# Patient Record
Sex: MALE | Race: BLACK OR AFRICAN AMERICAN | NOT HISPANIC OR LATINO | Employment: OTHER | URBAN - METROPOLITAN AREA
[De-identification: names, ages, dates, MRNs, and addresses within clinical notes are randomized per-mention and may not be internally consistent; named-entity substitution may affect disease eponyms.]

---

## 2017-01-27 ENCOUNTER — OFFICE VISIT (OUTPATIENT)
Dept: INTERNAL MEDICINE | Facility: CLINIC | Age: 77
End: 2017-01-27
Payer: MEDICARE

## 2017-01-27 ENCOUNTER — IMMUNIZATION (OUTPATIENT)
Dept: INTERNAL MEDICINE | Facility: CLINIC | Age: 77
End: 2017-01-27
Payer: MEDICARE

## 2017-01-27 VITALS
SYSTOLIC BLOOD PRESSURE: 110 MMHG | HEIGHT: 67 IN | WEIGHT: 162.69 LBS | DIASTOLIC BLOOD PRESSURE: 72 MMHG | HEART RATE: 66 BPM | BODY MASS INDEX: 25.53 KG/M2

## 2017-01-27 DIAGNOSIS — N13.8 BPH (BENIGN PROSTATIC HYPERTROPHY) WITH URINARY OBSTRUCTION: ICD-10-CM

## 2017-01-27 DIAGNOSIS — D64.9 NORMOCYTIC ANEMIA: ICD-10-CM

## 2017-01-27 DIAGNOSIS — Z00.00 ANNUAL PHYSICAL EXAM: ICD-10-CM

## 2017-01-27 DIAGNOSIS — E78.5 HYPERLIPIDEMIA, UNSPECIFIED HYPERLIPIDEMIA TYPE: ICD-10-CM

## 2017-01-27 DIAGNOSIS — E55.9 VITAMIN D INSUFFICIENCY: ICD-10-CM

## 2017-01-27 DIAGNOSIS — Z23 NEED FOR INFLUENZA VACCINATION: ICD-10-CM

## 2017-01-27 DIAGNOSIS — N40.1 BPH (BENIGN PROSTATIC HYPERTROPHY) WITH URINARY OBSTRUCTION: ICD-10-CM

## 2017-01-27 DIAGNOSIS — M25.50 ARTHRALGIA, UNSPECIFIED JOINT: Primary | ICD-10-CM

## 2017-01-27 DIAGNOSIS — Z23 NEED FOR 23-POLYVALENT PNEUMOCOCCAL POLYSACCHARIDE VACCINE: ICD-10-CM

## 2017-01-27 PROCEDURE — 90732 PPSV23 VACC 2 YRS+ SUBQ/IM: CPT | Mod: PBBFAC | Performed by: INTERNAL MEDICINE

## 2017-01-27 PROCEDURE — 99213 OFFICE O/P EST LOW 20 MIN: CPT | Mod: PBBFAC,25 | Performed by: INTERNAL MEDICINE

## 2017-01-27 PROCEDURE — G0008 ADMIN INFLUENZA VIRUS VAC: HCPCS | Mod: PBBFAC | Performed by: INTERNAL MEDICINE

## 2017-01-27 PROCEDURE — 99999 PR PBB SHADOW E&M-EST. PATIENT-LVL III: CPT | Mod: PBBFAC,,, | Performed by: INTERNAL MEDICINE

## 2017-01-27 PROCEDURE — 99215 OFFICE O/P EST HI 40 MIN: CPT | Mod: S$PBB,,, | Performed by: INTERNAL MEDICINE

## 2017-01-27 RX ORDER — ATORVASTATIN CALCIUM 80 MG/1
80 TABLET, FILM COATED ORAL DAILY
Qty: 90 TABLET | Refills: 3 | Status: SHIPPED | OUTPATIENT
Start: 2017-01-27 | End: 2018-07-16 | Stop reason: SDUPTHER

## 2017-01-27 NOTE — PROGRESS NOTES
Verified patient's name, date of birth and allergies. Patient advised to remain in building 15 min after injection.

## 2017-01-27 NOTE — MR AVS SNAPSHOT
[facility]  [address]  [facility] [address]  Phone: [phone]  Fax: [phone]                  Fadi Higgins   2017 2:00 PM   Office Visit    Description:  Male : 1940   Provider:  Ronald Finch MD   Department:  [facility]           Reason for Visit     Annual Exam           Diagnoses this Visit        Comments    Arthralgia, unspecified joint    -  Primary     Hyperlipidemia, unspecified hyperlipidemia type         Normocytic anemia         Vitamin D insufficiency         BPH (benign prostatic hypertrophy) with urinary obstruction         Need for 23-polyvalent pneumococcal polysaccharide vaccine         Need for influenza vaccination         Annual physical exam                To Do List           Goals (5 Years of Data)     None      Follow-Up and Disposition     Return in about 6 months (around 2017).       These Medications        Disp Refills Start End    atorvastatin (LIPITOR) 80 MG tablet 90 tablet 3 2017    Take 1 tablet (80 mg total) by mouth once daily. - Oral    [facility]acy: [facility] [address] [place] [address], [place] - [address] AT [facility]  & [facility] Ph #: [phone]         [facility] On Call     [facility] On Call Nurse Care Line -  Assistance  Registered nurses in the [facility] On Call Center provide clinical advisement, health education, appointment booking, and other advisory services.  Call for this free service at [phone].             Medications           Message regarding Medications     Verify the changes and/or additions to your medication regime listed below are the same as discussed with your clinician today.  If any of these changes or additions are incorrect, please notify your healthcare provider.        STOP taking these medications     simvastatin (ZOCOR) 40 MG tablet            Verify that the below list of medications is an accurate representation of the  "medications you are currently taking.  If none reported, the list may be blank. If incorrect, please contact your healthcare provider. Carry this list with you in case of emergency.           Current Medications     atorvastatin (LIPITOR) 80 MG tablet Take 1 tablet (80 mg total) by mouth once daily.    cholecalciferol, vitamin D3, 2,000 unit Cap Take 1 capsule by mouth once daily.    co-enzyme Q-10 50 mg capsule Take 50 mg by mouth once daily.    fish oil-omega-3 fatty acids 300-1,000 mg capsule Take 2 g by mouth once daily.    saw palmetto 160 MG capsule Take 160 mg by mouth 2 (two) times daily.           Clinical Reference Information           Vital Signs - Last Recorded  Most recent update: 1/27/2017  2:29 PM by Isabelle Goodman MA    BP Pulse Ht Wt BMI    110/72 (BP Location: Right arm, Patient Position: Sitting, BP Method: Manual) 66 5' 7" (1.702 m) 73.8 kg (162 lb 11.2 oz) 25.48 kg/m2      Blood Pressure          Most Recent Value    BP  110/72      Allergies as of 1/27/2017     No Known Allergies      Immunizations Administered on Date of Encounter - 1/27/2017     Name Date Dose VIS Date Route    Pneumococcal Polysaccharide - 23 Valent  Incomplete 0.5 mL 4/24/2015 Intramuscular      Orders Placed During Today's Visit      Normal Orders This Visit    Pneumococcal Polysaccharide Vaccine (23 Valent) (SQ/IM)     Future Labs/Procedures Expected by Expires    STEPHANIE  1/27/2017 4/27/2017    CBC Without Differential  1/27/2017 (Approximate) 3/28/2018    Comprehensive metabolic panel  1/27/2017 1/27/2018    CYCLIC CITRUL PEPTIDE ANTIBODY, IGG  1/27/2017 3/28/2018    Lipid panel  1/27/2017 3/28/2018    Rheumatoid factor  1/27/2017 4/27/2017    Vitamin D  1/27/2017 (Approximate) 3/28/2018      Instructions    Please continue to take atorvastatin 80 mg once daily (or every other day). Please check your home medications to see if you have simvastatin; if so, please discard this medication and continue the atorvastatin " instead.    Please do regular exercise, at least 2 hours (120 minutes) spread out throughout the week.    Please continue to use Naproxen (Aleve) 1-2 times per day as needed.

## 2017-01-27 NOTE — PROGRESS NOTES
Subjective:       Patient ID: Fadi Higgins is a 76 y.o. male.    Chief Complaint: Annual Exam    HPI    Last visit with me 1 yr ago. Since then seen by Orthopedics and Urology.     Reports some problems with left hip. Went to Orthopedics at outside clinic, recommended hip replacement. Reports pain. Given cumin for arthritis by dtr. Pain in PIP of ring fingers bilaterally. Diffuse osteoarthritis pain in wrists and shoulder as well.     Doesn't take cholesterol meds daily, watching what he eats.     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     Urinary symptoms doing well on saw palmetto.    Reviewed PMH, PSH, SH, FH, allergies, and medications.     Review of Systems   All other systems reviewed and are negative.      Objective:      Physical Exam   Constitutional: He is oriented to person, place, and time. No distress.   HENT:   Head: Atraumatic.   Right Ear: Tympanic membrane normal.   Left Ear: Tympanic membrane normal.   Mouth/Throat: Oropharynx is clear and moist. No oropharyngeal exudate.   Eyes: Pupils are equal, round, and reactive to light. Right eye exhibits no discharge. Left eye exhibits no discharge.   Neck: Normal range of motion. No thyromegaly present.   Cardiovascular: Normal rate, regular rhythm and normal heart sounds.    Pulmonary/Chest: Effort normal and breath sounds normal. He has no wheezes. He has no rales.   Abdominal: Soft. He exhibits no distension and no mass. There is no hepatosplenomegaly. There is no tenderness. There is no rigidity, no guarding and negative Bundy's sign.   Musculoskeletal: He exhibits no edema or tenderness.   Some enlargement of MCP joints bilaterally without erythema or effusion or tenderness to palpation.   Lymphadenopathy:     He has no cervical adenopathy.   Neurological: He is alert and oriented to person, place, and time.   Skin: Skin is warm and dry. No rash noted.   Psychiatric: He has a normal mood and affect. His behavior is normal.  "  Nursing note and vitals reviewed.      Vitals:    01/27/17 1427   BP: 110/72   BP Location: Right arm   Patient Position: Sitting   BP Method: Manual   Pulse: 66   Weight: 73.8 kg (162 lb 11.2 oz)   Height: 5' 7" (1.702 m)     Body mass index is 25.48 kg/(m^2).    RESULTS: Reviewed labs from last 18 months    Assessment:       1. Arthralgia, unspecified joint    2. Hyperlipidemia, unspecified hyperlipidemia type    3. Normocytic anemia    4. Vitamin D insufficiency    5. BPH (benign prostatic hypertrophy) with urinary obstruction    6. Need for 23-polyvalent pneumococcal polysaccharide vaccine        Plan:   Fadi was seen today for annual exam.    Diagnoses and all orders for this visit:    Arthralgia, unspecified joint:  Diffuse joint pain, likely osteoarthritis but given distribution will rule out autoimmune disease. Continue over-the-counter cumin, use Aleve 1-2 tabs daily PRN but not too often.  -     STEPHANIE; Future  -     Rheumatoid factor; Future  -     CYCLIC CITRUL PEPTIDE ANTIBODY, IGG; Future    Hyperlipidemia, unspecified hyperlipidemia type:  Recheck levels, continue Lipitor, pt using every other day, if levels okay will continue this regimen.  -     Lipid panel; Future  -     Comprehensive metabolic panel; Future  -     atorvastatin (LIPITOR) 80 MG tablet; Take 1 tablet (80 mg total) by mouth once daily.    Normocytic anemia:  Seen on prior labs, will continue to monitor, no signs of anemia.  -     CBC Without Differential; Future    Vitamin D insufficiency:  Prior diagnosis, well controlled on current management.   -     Vitamin D; Future    BPH (benign prostatic hypertrophy) with urinary obstruction:  Prior diagnosis, well controlled on current management of saw palmetto.    Need for 23-polyvalent pneumococcal polysaccharide vaccine  -     Pneumococcal Polysaccharide Vaccine (23 Valent) (SQ/IM)    Annual exam:  Age-appropriate health screening reviewed. Flu and Pneumovax today, will discuss Zoster " and TDaP next visit.     Return in about 6 months (around 7/27/2017).  Ronald Finch MD  Internal Medicine    Portions of this note were completed using Dragon medical dictation software. Please excuse typographical or syntax errors.

## 2017-01-30 ENCOUNTER — PATIENT MESSAGE (OUTPATIENT)
Dept: INTERNAL MEDICINE | Facility: CLINIC | Age: 77
End: 2017-01-30

## 2017-01-30 ENCOUNTER — LAB VISIT (OUTPATIENT)
Dept: LAB | Facility: HOSPITAL | Age: 77
End: 2017-01-30
Attending: INTERNAL MEDICINE
Payer: MEDICARE

## 2017-01-30 DIAGNOSIS — D64.9 NORMOCYTIC ANEMIA: ICD-10-CM

## 2017-01-30 DIAGNOSIS — M25.50 ARTHRALGIA, UNSPECIFIED JOINT: ICD-10-CM

## 2017-01-30 DIAGNOSIS — E55.9 VITAMIN D INSUFFICIENCY: ICD-10-CM

## 2017-01-30 DIAGNOSIS — E78.5 HYPERLIPIDEMIA, UNSPECIFIED HYPERLIPIDEMIA TYPE: ICD-10-CM

## 2017-01-30 LAB
25(OH)D3+25(OH)D2 SERPL-MCNC: 37 NG/ML
ALBUMIN SERPL BCP-MCNC: 3.5 G/DL
ALP SERPL-CCNC: 89 U/L
ALT SERPL W/O P-5'-P-CCNC: 13 U/L
ANION GAP SERPL CALC-SCNC: 8 MMOL/L
AST SERPL-CCNC: 19 U/L
BILIRUB SERPL-MCNC: 0.6 MG/DL
BUN SERPL-MCNC: 12 MG/DL
CALCIUM SERPL-MCNC: 9 MG/DL
CCP AB SER IA-ACNC: 0.5 U/ML
CHLORIDE SERPL-SCNC: 107 MMOL/L
CHOLEST/HDLC SERPL: 3.6 {RATIO}
CO2 SERPL-SCNC: 26 MMOL/L
CREAT SERPL-MCNC: 0.9 MG/DL
ERYTHROCYTE [DISTWIDTH] IN BLOOD BY AUTOMATED COUNT: 12.9 %
EST. GFR  (AFRICAN AMERICAN): >60 ML/MIN/1.73 M^2
EST. GFR  (NON AFRICAN AMERICAN): >60 ML/MIN/1.73 M^2
GLUCOSE SERPL-MCNC: 85 MG/DL
HCT VFR BLD AUTO: 37.7 %
HDL/CHOLESTEROL RATIO: 28.1 %
HDLC SERPL-MCNC: 171 MG/DL
HDLC SERPL-MCNC: 48 MG/DL
HGB BLD-MCNC: 12.6 G/DL
LDLC SERPL CALC-MCNC: 112.2 MG/DL
MCH RBC QN AUTO: 27.7 PG
MCHC RBC AUTO-ENTMCNC: 33.4 %
MCV RBC AUTO: 83 FL
NONHDLC SERPL-MCNC: 123 MG/DL
PLATELET # BLD AUTO: 261 K/UL
PMV BLD AUTO: 10.2 FL
POTASSIUM SERPL-SCNC: 4.3 MMOL/L
PROT SERPL-MCNC: 7.4 G/DL
RBC # BLD AUTO: 4.55 M/UL
RHEUMATOID FACT SERPL-ACNC: <10 IU/ML
SODIUM SERPL-SCNC: 141 MMOL/L
TRIGL SERPL-MCNC: 54 MG/DL
WBC # BLD AUTO: 5.1 K/UL

## 2017-01-30 PROCEDURE — 80053 COMPREHEN METABOLIC PANEL: CPT

## 2017-01-30 PROCEDURE — 82306 VITAMIN D 25 HYDROXY: CPT

## 2017-01-30 PROCEDURE — 85027 COMPLETE CBC AUTOMATED: CPT

## 2017-01-30 PROCEDURE — 86038 ANTINUCLEAR ANTIBODIES: CPT

## 2017-01-30 PROCEDURE — 86431 RHEUMATOID FACTOR QUANT: CPT

## 2017-01-30 PROCEDURE — 86200 CCP ANTIBODY: CPT

## 2017-01-30 PROCEDURE — 80061 LIPID PANEL: CPT

## 2017-01-30 PROCEDURE — 36415 COLL VENOUS BLD VENIPUNCTURE: CPT | Mod: PO

## 2017-01-31 LAB — ANA SER QL IF: NORMAL

## 2017-05-31 ENCOUNTER — OFFICE VISIT (OUTPATIENT)
Dept: INTERNAL MEDICINE | Facility: CLINIC | Age: 77
End: 2017-05-31
Payer: MEDICARE

## 2017-05-31 VITALS
HEART RATE: 69 BPM | SYSTOLIC BLOOD PRESSURE: 112 MMHG | HEIGHT: 67 IN | DIASTOLIC BLOOD PRESSURE: 66 MMHG | WEIGHT: 153.44 LBS | BODY MASS INDEX: 24.08 KG/M2 | TEMPERATURE: 99 F | OXYGEN SATURATION: 98 %

## 2017-05-31 DIAGNOSIS — M79.89 BILATERAL HAND SWELLING: Primary | ICD-10-CM

## 2017-05-31 PROCEDURE — 99213 OFFICE O/P EST LOW 20 MIN: CPT | Mod: PBBFAC | Performed by: INTERNAL MEDICINE

## 2017-05-31 PROCEDURE — 99213 OFFICE O/P EST LOW 20 MIN: CPT | Mod: S$PBB,,, | Performed by: INTERNAL MEDICINE

## 2017-05-31 PROCEDURE — 99999 PR PBB SHADOW E&M-EST. PATIENT-LVL III: CPT | Mod: PBBFAC,,, | Performed by: INTERNAL MEDICINE

## 2017-05-31 NOTE — PROGRESS NOTES
"Subjective:       Patient ID: Fadi Higgins is a 76 y.o. male.    Chief Complaint: Joint Swelling    HPI     Pt here today for focused urgent care visit for joint swelling.  Says his hands and feet were swollen yesterday.  Feels better today.  Some associated pain and stiffness.  No associated warm or erythema.  Denies hx of prolonged morning stiffness.  Was apparently seen by his PCP, Dr. Finch, for joint issues in the past.  Chart reviewed, RA w/u neg (neg RF, neg anti-CCP) and STEPHANIE neg.    Has also had hip pain, xrays previously c OA and was seen by ortho who rec hip replacement per pt but pt was reluctant because he was afraid he "wouldn't walk again after surgery."    Review of Systems   Constitutional: Negative for activity change and fatigue.   Respiratory: Negative for shortness of breath.    Cardiovascular: Negative for chest pain.   Musculoskeletal: Positive for arthralgias (as per HPI) and joint swelling. Negative for back pain, gait problem and myalgias.         Objective:   /66 (BP Location: Left arm, Patient Position: Sitting)   Pulse 69   Temp 98.6 °F (37 °C)   Ht 5' 7" (1.702 m)   Wt 69.6 kg (153 lb 7 oz)   SpO2 98%   BMI 24.03 kg/m²      Physical Exam   Cardiovascular: Normal rate, regular rhythm and normal heart sounds.    Pulmonary/Chest: Effort normal and breath sounds normal.   Musculoskeletal: He exhibits no edema or tenderness.       Hands and feet c no notable swelling, appears to have some stiffness of L hand c some difficulty clenching L fist tightly but on further exam was normal.      Assessment/Plan     Fadi was seen today for joint swelling.    Diagnoses and all orders for this visit:    Bilateral hand swelling  - Also reported swelling of feet yesterday.  Recent RA w/u neg.  Distribution not c/w gout.  Suspect OA flare (also has OA hip).  Since sx have improved significantly since yesterday c otc nsaid, do not feel additional tx or w/u needed at this time, no swelling " noted on exam today.  Advised to take otc aleve prn, cr wnl.    Can f/u prn, cont routine f/u c Dr. Finch.    Rachel Carson MD  Department of Internal Medicine - [facility]  2:17 PM

## 2017-11-06 ENCOUNTER — TELEPHONE (OUTPATIENT)
Dept: OPHTHALMOLOGY | Facility: CLINIC | Age: 77
End: 2017-11-06

## 2017-11-07 NOTE — PROGRESS NOTES
Assessment /Plan     For exam results, see Encounter Report.    Nevus of left conjunctiva    Status post cataract extraction and insertion of intraocular lens, unspecified laterality      Floaters OD x 3 months  No flashes / Va change    Doing well  Left Nevus of Silvino evaluated by retina service - quiet  Retina 11/26/2013 quiet    PC IOL OU  quiet    Try MRx +250  Bent glasses    Floaters OD  RD precautions:  Discussed symptoms of RD with increased flashes, floaters, decreasing vision.  Patient/Family to call and return immediately to clinic should the symptoms of RD occur. Voiced good understanding with Q & A.        Plan  RTC 1 year IOP & DFE  RTC sooner prn with good understanding

## 2017-11-08 ENCOUNTER — OFFICE VISIT (OUTPATIENT)
Dept: OPHTHALMOLOGY | Facility: CLINIC | Age: 77
End: 2017-11-08
Payer: MEDICARE

## 2017-11-08 DIAGNOSIS — Z96.1 STATUS POST CATARACT EXTRACTION AND INSERTION OF INTRAOCULAR LENS, UNSPECIFIED LATERALITY: ICD-10-CM

## 2017-11-08 DIAGNOSIS — D31.02 NEVUS OF LEFT CONJUNCTIVA: ICD-10-CM

## 2017-11-08 DIAGNOSIS — H43.391 VITREOUS FLOATERS OF RIGHT EYE: Primary | ICD-10-CM

## 2017-11-08 DIAGNOSIS — Z98.49 STATUS POST CATARACT EXTRACTION AND INSERTION OF INTRAOCULAR LENS, UNSPECIFIED LATERALITY: ICD-10-CM

## 2017-11-08 PROCEDURE — 92014 COMPRE OPH EXAM EST PT 1/>: CPT | Mod: S$PBB,,, | Performed by: OPHTHALMOLOGY

## 2017-11-08 PROCEDURE — 99999 PR PBB SHADOW E&M-EST. PATIENT-LVL II: CPT | Mod: PBBFAC,,, | Performed by: OPHTHALMOLOGY

## 2017-11-08 PROCEDURE — 99212 OFFICE O/P EST SF 10 MIN: CPT | Mod: PBBFAC | Performed by: OPHTHALMOLOGY

## 2018-01-08 ENCOUNTER — HOSPITAL ENCOUNTER (OUTPATIENT)
Dept: RADIOLOGY | Facility: HOSPITAL | Age: 78
Discharge: HOME | End: 2018-01-08
Attending: PHYSICIAN ASSISTANT
Payer: MEDICARE

## 2018-01-08 DIAGNOSIS — M25.552 LEFT HIP PAIN: ICD-10-CM

## 2018-01-25 ENCOUNTER — HOSPITAL ENCOUNTER (OUTPATIENT)
Dept: RADIOLOGY | Facility: HOSPITAL | Age: 78
Discharge: HOME | End: 2018-01-25
Attending: ORTHOPAEDIC SURGERY
Payer: MEDICARE

## 2018-01-25 ENCOUNTER — OFFICE VISIT (OUTPATIENT)
Dept: ORTHOPEDICS | Facility: CLINIC | Age: 78
End: 2018-01-25
Payer: MEDICARE

## 2018-01-25 VITALS — WEIGHT: 159.31 LBS | HEIGHT: 67 IN | BODY MASS INDEX: 25 KG/M2

## 2018-01-25 DIAGNOSIS — M25.552 LEFT HIP PAIN: ICD-10-CM

## 2018-01-25 DIAGNOSIS — M16.12 PRIMARY OSTEOARTHRITIS OF LEFT HIP: ICD-10-CM

## 2018-01-25 DIAGNOSIS — M25.552 LEFT HIP PAIN: Primary | ICD-10-CM

## 2018-01-25 PROCEDURE — 73502 X-RAY EXAM HIP UNI 2-3 VIEWS: CPT | Mod: TC,LT

## 2018-01-25 PROCEDURE — 99212 OFFICE O/P EST SF 10 MIN: CPT | Mod: PBBFAC,25 | Performed by: ORTHOPAEDIC SURGERY

## 2018-01-25 PROCEDURE — 73502 X-RAY EXAM HIP UNI 2-3 VIEWS: CPT | Mod: 26,LT,, | Performed by: RADIOLOGY

## 2018-01-25 PROCEDURE — 99999 PR PBB SHADOW E&M-EST. PATIENT-LVL II: CPT | Mod: PBBFAC,,, | Performed by: ORTHOPAEDIC SURGERY

## 2018-01-25 PROCEDURE — 99204 OFFICE O/P NEW MOD 45 MIN: CPT | Mod: S$PBB,,, | Performed by: ORTHOPAEDIC SURGERY

## 2018-01-25 NOTE — PROGRESS NOTES
"HPI:    Patient is here today for a chief complaint of left hip pain.     Duration: 24 months  Intensity: severe  Character of pain: sharp  Location: He reports that the pain is predominately  In the groin.  Patient pain increases with activity.  Pain is worse with weightbearing and the pain interferes with activities of daily living.    He has tried conservative management including NSAIDS and activity modification without relief.    He has discussed options with his family and wishes to schedule LUIS     PMSFSH reviewed per clinic record    Past Medical History:   Diagnosis Date    BPH (benign prostatic hyperplasia)     Chronic constipation     Hyperlipidemia     Osteoarthritis     Vitiligo           Current Outpatient Prescriptions:     atorvastatin (LIPITOR) 80 MG tablet, Take 1 tablet (80 mg total) by mouth once daily., Disp: 90 tablet, Rfl: 3    cholecalciferol, vitamin D3, 2,000 unit Cap, Take 1 capsule by mouth once daily., Disp: , Rfl:     fish oil-omega-3 fatty acids 300-1,000 mg capsule, Take 2 g by mouth once daily., Disp: , Rfl:     saw palmetto 160 MG capsule, Take 160 mg by mouth 2 (two) times daily., Disp: , Rfl:      Review of patient's allergies indicates:  No Known Allergies       ROS  Constitutional: Negative for fever, Negative for weight loss  HENT Negative for congestion  Cardiovascular: Negative chest pain  Respiratory: Negative Shortness of breath  Heme: Negative excessive bleeding  Skin:NegativeItching, Negative breakdown  Musculoskeletal:Negative for back pain, Positive for joint pain, Negative muscle pain, Negative muscle weakness  Neurological: Negative for numbness and paresthesias   Psychiatric/Behavioral: Negative altered mental status, Negative for depression    Physical Exam:   Ht 5' 7" (1.702 m)   Wt 72.3 kg (159 lb 4.5 oz)   BMI 24.95 kg/m²   General appearance: This is a well-developed, Well nourished male No obvious acute distress.  Psychiatric: normal mood and affect; "  pleasant and conversant; judgment and thought content normal  Gait is coordinated. Patient has antalgic gait to the left  Cardiovascular: There are no swelling or varicosities present.   Respiratory: normal respiratory effort   Lymphatic: no adenopathy   Neurologic: alert and oriented to person, place, and time   Deep Tendon Reflexes are normal;  Coordination and Balance: Normal   Musculoskeletal   Neck    ROM shows normal flexion and extension and lateral rotation    Palpation: Non-tender    Stability is normal    Strength is normal    Skin is normal without masses and lesions    Sensation is intact to light touch   Back   There is No back tenderness.    ROM showsabnormal flexion, extension and    rotation    Palpation shows no masses    Stability is normal    Strength to flexion and extension well maintained    Core strength is diminished    Skin shows no rashes or cafe au lait spots;     Sensation is intact to light touch    The Right hip is examined and ROM show flexion 110 , internal rotation 35, external rotation 45. These passive ranges of motion are painful No   Straight leg raising is negative.      The Left hip is examined and ROM show flexion 60 , internal rotation 10, external rotation 20. These passes range is some motion are painful Yes.  Straight leg raising is negative.      Xrays show severe degenerative changes including subchondral sclerosis, periarticular osteophytes and joint space narrowing.     The bone loss is already quite significant and physical therapy  is contraindicated due to potential joint destruction.     Assessment:  Hip arthritis left      He will need to be cleared by Preop center.         He  has a past medical history of BPH (benign prostatic hyperplasia); Chronic constipation; Hyperlipidemia; Osteoarthritis; and Vitiligo. . We will have to take this into account. He  will be followed by the hospitalist service while in the hospital.       We have gone over the hospitalization  and recovery with him as well.  This is typically around 2 weeks on a walker and transition to a cane after that.  He will have home health likely for 3-4 weeks and then transition to outpatient if necessary.  He is agreement with this plan of care and is ready to proceed.  I will see him back for clinical recheck at the 2-week postop jac.  I will see him back for clinical recheck for any other questions or problems as needed and certainly for any other issues in the interim.    We have discussed risks of total hip replacement which include but are not limited to blood clots in the legs that can travel to the lungs (pulmonary embolism). Pulmonary embolism can cause shortness of breath, chest pain, and even shock. Other risks include urinary tract infection, nausea and vomiting (usually related to pain medication), chronic knee pain and stiffness, bleeding into the hip joint, dislocation of the hip, leg length inequality, nerve damage, blood vessel injury, and infection of the hip which can require re-operation. Furthermore, the risks of anesthesia include potential heart, lung, kidney, and liver damage.

## 2018-01-25 NOTE — LETTER
January 25, 2018      Ayla Read PA-C  [address]  [facility] [address]           [facility] - Orthopedics  [address]  [facility] [address]  Phone: [phone]          Patient: Fadi Higgins   MR Number: [id number]   YOB: 1940   Date of Visit: 1/25/2018       Dear Ayla Read:    Thank you for referring Fadi Higgins to me for evaluation. Attached you will find relevant portions of my assessment and plan of care.    If you have questions, please do not hesitate to call me. I look forward to following Fadi Higgins along with you.    Sincerely,    [facility] MD    Enclosure  CC:  No Recipients    If you would like to receive this communication electronically, please contact [email or web address] or [phone] to request more information on [employer] access.    For providers and/or their staff who would like to refer a patient to [facility], please contact us through our one-stop-shop provider referral line, [facility] [place], at [phone].    If you feel you have received this communication in error or would no longer like to receive these types of communications, please e-mail [email or web address]

## 2018-07-16 ENCOUNTER — OFFICE VISIT (OUTPATIENT)
Dept: INTERNAL MEDICINE | Facility: CLINIC | Age: 78
End: 2018-07-16
Payer: MEDICARE

## 2018-07-16 ENCOUNTER — TELEPHONE (OUTPATIENT)
Dept: INTERNAL MEDICINE | Facility: CLINIC | Age: 78
End: 2018-07-16

## 2018-07-16 ENCOUNTER — CLINICAL SUPPORT (OUTPATIENT)
Dept: INTERNAL MEDICINE | Facility: CLINIC | Age: 78
End: 2018-07-16
Payer: MEDICARE

## 2018-07-16 VITALS
WEIGHT: 156.5 LBS | HEART RATE: 60 BPM | BODY MASS INDEX: 24.56 KG/M2 | DIASTOLIC BLOOD PRESSURE: 62 MMHG | SYSTOLIC BLOOD PRESSURE: 106 MMHG | HEIGHT: 67 IN

## 2018-07-16 DIAGNOSIS — N13.8 BPH WITH URINARY OBSTRUCTION: ICD-10-CM

## 2018-07-16 DIAGNOSIS — M16.12 PRIMARY OSTEOARTHRITIS OF LEFT HIP: Primary | ICD-10-CM

## 2018-07-16 DIAGNOSIS — Z23 NEED FOR DIPHTHERIA-TETANUS-PERTUSSIS (TDAP) VACCINE: ICD-10-CM

## 2018-07-16 DIAGNOSIS — E78.5 HYPERLIPIDEMIA, UNSPECIFIED HYPERLIPIDEMIA TYPE: ICD-10-CM

## 2018-07-16 DIAGNOSIS — I70.0 ABDOMINAL AORTIC ATHEROSCLEROSIS: ICD-10-CM

## 2018-07-16 DIAGNOSIS — N40.1 BPH WITH URINARY OBSTRUCTION: ICD-10-CM

## 2018-07-16 PROCEDURE — 99999 PR PBB SHADOW E&M-EST. PATIENT-LVL III: CPT | Mod: PBBFAC,,, | Performed by: INTERNAL MEDICINE

## 2018-07-16 PROCEDURE — 99213 OFFICE O/P EST LOW 20 MIN: CPT | Mod: PBBFAC | Performed by: INTERNAL MEDICINE

## 2018-07-16 PROCEDURE — 99215 OFFICE O/P EST HI 40 MIN: CPT | Mod: S$PBB,,, | Performed by: INTERNAL MEDICINE

## 2018-07-16 RX ORDER — ATORVASTATIN CALCIUM 80 MG/1
80 TABLET, FILM COATED ORAL DAILY
Qty: 90 TABLET | Refills: 3 | Status: SHIPPED | OUTPATIENT
Start: 2018-07-16 | End: 2019-09-05 | Stop reason: SDUPTHER

## 2018-07-16 NOTE — PROGRESS NOTES
Subjective:       Patient ID: Fadi Higgins is a 77 y.o. male.    Chief Complaint: Annual Exam and Hip Pain (lt)    HPI    Last visit with me 01/2017.  Since then seen by Internal Medicine, Ophthalmology, Orthopedics.     Longstanding left hip pain. Hasn't decided yet if he wants to do hip replacement. Very difficult to walk. Joint aspiration hasn't helped.    Some nights get up 3-4x/night to urinate, not every night, not too bothersome.    Reviewed PMH, PSH, SH, FH, allergies, and medications.     Review of Systems   All other systems reviewed and are negative.      Objective:      Physical Exam   Constitutional: He is oriented to person, place, and time. No distress.   -American man whose Body mass index is 24.52 kg/m².    HENT:   Head: Atraumatic.   Right Ear: Tympanic membrane normal. No tenderness.   Left Ear: No tenderness.   Mouth/Throat: Oropharynx is clear and moist. No oropharyngeal exudate.   tympanic membrane obscured by cerumen on left    Eyes: Pupils are equal, round, and reactive to light. Right eye exhibits no discharge. Left eye exhibits no discharge.   Neck: Normal range of motion. No thyromegaly present.   Cardiovascular: Normal rate, regular rhythm and normal heart sounds.    Pulmonary/Chest: Effort normal and breath sounds normal. No stridor. He has no wheezes. He has no rales.   Abdominal: Soft. He exhibits no distension and no mass. There is no tenderness. There is no guarding.   Genitourinary: Rectum normal. Rectal exam shows no external hemorrhoid. Prostate is enlarged (without nodules). Prostate is not tender.   Musculoskeletal: He exhibits no edema or tenderness.   Ambulates with limp due to pain in left hip   Lymphadenopathy:     He has no cervical adenopathy.   Neurological: He is alert and oriented to person, place, and time.   Skin: Skin is warm and dry. No rash noted.   Psychiatric: He has a normal mood and affect. His behavior is normal.   Nursing note and vitals  "reviewed.      Vitals:    07/16/18 0931   BP: 106/62   BP Location: Right arm   Patient Position: Sitting   BP Method: Large (Manual)   Pulse: 60   Weight: 71 kg (156 lb 8.4 oz)   Height: 5' 7" (1.702 m)     Body mass index is 24.52 kg/m².    Assessment:       1. Primary osteoarthritis of left hip    2. Hyperlipidemia, unspecified hyperlipidemia type    3. Abdominal aortic atherosclerosis    4. BPH with urinary obstruction    5. Need for diphtheria-tetanus-pertussis (Tdap) vaccine        Plan:   Fadi was seen today for annual exam and hip pain.    Diagnoses and all orders for this visit:    Primary osteoarthritis of left hip:  Chronic problem, significant, limiting ability to walk long distances. 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 Still not sure about doing surgery, Orthopedics has recommended THR. continue follow up with Orthopedics.    Hyperlipidemia, unspecified hyperlipidemia type:  Prior dx, out of statin, restart Lipitor and check labs in 4 weeks.  -     atorvastatin (LIPITOR) 80 MG tablet; Take 1 tablet (80 mg total) by mouth once daily.  -     Comprehensive metabolic panel; Future  -     Lipid panel; Future    Abdominal aortic atherosclerosis:  Prior dx, asymptomatic. Continue control of blood pressure and cholesterol to limit further atherosclerosis.   -     CBC Without Differential; Future    BPH with urinary obstruction:  Prior dx, today prostate is still enlarged, no nodules. Getting up 3-4x/night some nights. Discussed Flomax, pt defers. Check PSA levels.  -     Prostate Specific Antigen, Diagnostic; Future    Need for diphtheria-tetanus-pertussis (Tdap) vaccine    Follow-up in about 1 year (around 7/16/2019) for EPP annual exam. Fasting labs in 4 weeks.  Ronald Finch MD  Internal Medicine    Portions of this note were completed using medical dictation software. Please excuse typographical or syntax errors that were missed on review.    "

## 2018-08-13 ENCOUNTER — LAB VISIT (OUTPATIENT)
Dept: LAB | Facility: HOSPITAL | Age: 78
End: 2018-08-13
Attending: INTERNAL MEDICINE
Payer: MEDICARE

## 2018-08-13 DIAGNOSIS — I70.0 ABDOMINAL AORTIC ATHEROSCLEROSIS: ICD-10-CM

## 2018-08-13 DIAGNOSIS — N40.1 BPH WITH URINARY OBSTRUCTION: ICD-10-CM

## 2018-08-13 DIAGNOSIS — E78.5 HYPERLIPIDEMIA, UNSPECIFIED HYPERLIPIDEMIA TYPE: ICD-10-CM

## 2018-08-13 DIAGNOSIS — N13.8 BPH WITH URINARY OBSTRUCTION: ICD-10-CM

## 2018-08-13 LAB
ALBUMIN SERPL BCP-MCNC: 3.5 G/DL
ALP SERPL-CCNC: 92 U/L
ALT SERPL W/O P-5'-P-CCNC: 22 U/L
ANION GAP SERPL CALC-SCNC: 6 MMOL/L
AST SERPL-CCNC: 23 U/L
BILIRUB SERPL-MCNC: 0.6 MG/DL
BUN SERPL-MCNC: 13 MG/DL
CALCIUM SERPL-MCNC: 8.7 MG/DL
CHLORIDE SERPL-SCNC: 109 MMOL/L
CHOLEST SERPL-MCNC: 170 MG/DL
CHOLEST/HDLC SERPL: 3.4 {RATIO}
CO2 SERPL-SCNC: 27 MMOL/L
COMPLEXED PSA SERPL-MCNC: 1.3 NG/ML
CREAT SERPL-MCNC: 0.8 MG/DL
ERYTHROCYTE [DISTWIDTH] IN BLOOD BY AUTOMATED COUNT: 13.3 %
EST. GFR  (AFRICAN AMERICAN): >60 ML/MIN/1.73 M^2
EST. GFR  (NON AFRICAN AMERICAN): >60 ML/MIN/1.73 M^2
GLUCOSE SERPL-MCNC: 88 MG/DL
HCT VFR BLD AUTO: 35.9 %
HDLC SERPL-MCNC: 50 MG/DL
HDLC SERPL: 29.4 %
HGB BLD-MCNC: 11.8 G/DL
LDLC SERPL CALC-MCNC: 107.2 MG/DL
MCH RBC QN AUTO: 27.8 PG
MCHC RBC AUTO-ENTMCNC: 32.9 G/DL
MCV RBC AUTO: 85 FL
NONHDLC SERPL-MCNC: 120 MG/DL
PLATELET # BLD AUTO: 248 K/UL
PMV BLD AUTO: 9.4 FL
POTASSIUM SERPL-SCNC: 4.2 MMOL/L
PROT SERPL-MCNC: 6.8 G/DL
RBC # BLD AUTO: 4.24 M/UL
SODIUM SERPL-SCNC: 142 MMOL/L
TRIGL SERPL-MCNC: 64 MG/DL
WBC # BLD AUTO: 3.92 K/UL

## 2018-08-13 PROCEDURE — 84153 ASSAY OF PSA TOTAL: CPT

## 2018-08-13 PROCEDURE — 80061 LIPID PANEL: CPT

## 2018-08-13 PROCEDURE — 80053 COMPREHEN METABOLIC PANEL: CPT

## 2018-08-13 PROCEDURE — 85027 COMPLETE CBC AUTOMATED: CPT

## 2018-08-13 PROCEDURE — 36415 COLL VENOUS BLD VENIPUNCTURE: CPT

## 2018-08-14 ENCOUNTER — PATIENT MESSAGE (OUTPATIENT)
Dept: INTERNAL MEDICINE | Facility: CLINIC | Age: 78
End: 2018-08-14

## 2019-03-06 ENCOUNTER — TELEPHONE (OUTPATIENT)
Dept: INTERNAL MEDICINE | Facility: CLINIC | Age: 79
End: 2019-03-06

## 2019-03-06 NOTE — TELEPHONE ENCOUNTER
----- Message from Rod Bran sent at 3/6/2019  7:45 AM CST -----  Contact: Patient [phone]  The patient is requesting a referral to a non [facility] for his hip in [place], at [facility] physical therapy, fax# [phone] and phone [phone]. The  name is Robbielianne Christian.    He has an appointment this morning and they told him that they have not receive the referral yet.    Please call him and let him know the status.    Thank you

## 2019-09-04 ENCOUNTER — OFFICE VISIT (OUTPATIENT)
Dept: INTERNAL MEDICINE | Facility: CLINIC | Age: 79
End: 2019-09-04
Payer: MEDICARE

## 2019-09-04 VITALS
BODY MASS INDEX: 24.53 KG/M2 | SYSTOLIC BLOOD PRESSURE: 110 MMHG | OXYGEN SATURATION: 96 % | DIASTOLIC BLOOD PRESSURE: 62 MMHG | WEIGHT: 156.31 LBS | HEIGHT: 67 IN | HEART RATE: 62 BPM

## 2019-09-04 DIAGNOSIS — H91.93 DECREASED HEARING, BILATERAL: ICD-10-CM

## 2019-09-04 DIAGNOSIS — N40.1 BPH WITH URINARY OBSTRUCTION: ICD-10-CM

## 2019-09-04 DIAGNOSIS — N13.8 BPH WITH URINARY OBSTRUCTION: ICD-10-CM

## 2019-09-04 DIAGNOSIS — I70.0 ABDOMINAL AORTIC ATHEROSCLEROSIS: ICD-10-CM

## 2019-09-04 DIAGNOSIS — R30.0 BURNING WITH URINATION: ICD-10-CM

## 2019-09-04 DIAGNOSIS — E78.5 HYPERLIPIDEMIA, UNSPECIFIED HYPERLIPIDEMIA TYPE: ICD-10-CM

## 2019-09-04 DIAGNOSIS — E55.9 VITAMIN D INSUFFICIENCY: ICD-10-CM

## 2019-09-04 DIAGNOSIS — Z00.00 ENCOUNTER FOR PREVENTIVE HEALTH EXAMINATION: Primary | ICD-10-CM

## 2019-09-04 PROCEDURE — G0439 PPPS, SUBSEQ VISIT: HCPCS | Mod: S$GLB,,, | Performed by: NURSE PRACTITIONER

## 2019-09-04 PROCEDURE — 99999 PR PBB SHADOW E&M-EST. PATIENT-LVL IV: ICD-10-PCS | Mod: PBBFAC,,, | Performed by: NURSE PRACTITIONER

## 2019-09-04 PROCEDURE — 99999 PR PBB SHADOW E&M-EST. PATIENT-LVL IV: CPT | Mod: PBBFAC,,, | Performed by: NURSE PRACTITIONER

## 2019-09-04 PROCEDURE — 99214 OFFICE O/P EST MOD 30 MIN: CPT | Mod: PBBFAC | Performed by: NURSE PRACTITIONER

## 2019-09-04 PROCEDURE — G0439 PR MEDICARE ANNUAL WELLNESS SUBSEQUENT VISIT: ICD-10-PCS | Mod: S$GLB,,, | Performed by: NURSE PRACTITIONER

## 2019-09-04 NOTE — PATIENT INSTRUCTIONS
Counseling and Referral of Other Preventative  (Italic type indicates deductible and co-insurance are waived)    Patient Name: Fadi Higgins  Today's Date: 9/4/2019    Health Maintenance       Date Due Completion Date    TETANUS VACCINE 08/20/1960 ---    Shingles Vaccine (1 of 2) 08/20/1992 ---    Lipid Panel 08/13/2019 8/13/2018    Influenza Vaccine (1) 09/01/2019 1/27/2017        Orders Placed This Encounter   Procedures    URINALYSIS    Ambulatory Referral to Audiology     The following information is provided to all patients.  This information is to help you find resources for any of the problems found today that may be affecting your health:                Living healthy guide: www.[facility].[place].gov      Understanding Diabetes: www.diabetes.org      Eating healthy: www.cdc.gov/healthyweight      CDC home safety checklist: www.cdc.gov/steadi/patient.html      Agency on Aging: www.goea.[place].[facility]      Alcoholics anonymous (AA): www.aa.org      Physical Activity: www.kendy.nih.gov/db6rdsh      Tobacco use: www.quitwithusla.org

## 2019-09-05 ENCOUNTER — PATIENT MESSAGE (OUTPATIENT)
Dept: INTERNAL MEDICINE | Facility: CLINIC | Age: 79
End: 2019-09-05

## 2019-09-05 DIAGNOSIS — N30.00 ACUTE CYSTITIS WITHOUT HEMATURIA: Primary | ICD-10-CM

## 2019-09-05 RX ORDER — ATORVASTATIN CALCIUM 80 MG/1
80 TABLET, FILM COATED ORAL NIGHTLY
Qty: 90 TABLET | Refills: 3 | Status: SHIPPED | OUTPATIENT
Start: 2019-09-05 | End: 2019-10-16 | Stop reason: SDUPTHER

## 2019-09-05 RX ORDER — CIPROFLOXACIN 500 MG/1
500 TABLET ORAL 2 TIMES DAILY
Qty: 14 TABLET | Refills: 0 | Status: SHIPPED | OUTPATIENT
Start: 2019-09-05 | End: 2019-09-12

## 2019-09-05 NOTE — TELEPHONE ENCOUNTER
Please call the patient to notify that his urine tests suggest a urinary tract infection. I am sending in a prescription for antibiotics to his pharmacy; take for 1 wk, Please notify the office if the symptoms persist or worsen. Keep appointment with me next month for further evaluation.

## 2019-09-05 NOTE — PROGRESS NOTES
"Fadi Higgins presented for a  Medicare AWV and comprehensive Health Risk Assessment today. The following components were reviewed and updated:    · Medical history  · Family History  · Social history  · Allergies and Current Medications  · Health Risk Assessment  · Health Maintenance  · Care Team     ** See Completed Assessments for Annual Wellness Visit within the encounter summary.**       The following assessments were completed:  · Living Situation  · CAGE  · Depression Screening  · Timed Get Up and Go  · Whisper Test  · Cognitive Function Screening  ·   ·   · Nutrition Screening  · ADL Screening  · PAQ Screening    Vitals:    09/04/19 1012   BP: 110/62   Pulse: 62   SpO2: 96%   Weight: 70.9 kg (156 lb 4.9 oz)   Height: 5' 6.5" (1.689 m)     Body mass index is 24.85 kg/m².  Physical Exam   Constitutional: He is oriented to person, place, and time. He appears well-developed and well-nourished.   HENT:   Head: Normocephalic and atraumatic.   Nose: Nose normal.   Eyes: Conjunctivae and EOM are normal.   Cardiovascular: Normal rate, regular rhythm, normal heart sounds and intact distal pulses.   Pulmonary/Chest: Effort normal and breath sounds normal.   Musculoskeletal: Normal range of motion.   Neurological: He is alert and oriented to person, place, and time.   Skin: Skin is warm and dry.   Psychiatric: He has a normal mood and affect. His behavior is normal. Judgment and thought content normal.   Nursing note and vitals reviewed.        Diagnoses and health risks identified today and associated recommendations/orders:    1. Encounter for preventive health examination  Assessment performed. Health maintenance updated. Chart review completed.    2. Abdominal aortic atherosclerosis  Noted on imaging. Chronic. Stable. Followed by PCP.    3. Burning with urination  - URINALYSIS; Future  Reports vague urinary symptoms X1 week. Urine has been discolored and cloudy.  Treatment pending report.    4. Decreased hearing, " bilateral  - Ambulatory Referral to Audiology    5. Hyperlipidemia, unspecified hyperlipidemia type  - atorvastatin (LIPITOR) 80 MG tablet; Take 1 tablet (80 mg total) by mouth every evening.  Dispense: 90 tablet; Refill: 3  Component      Latest Ref Rng & Units 8/13/2018   Cholesterol      120 - 199 mg/dL 170   Triglycerides      30 - 150 mg/dL 64   HDL      40 - 75 mg/dL 50   LDL Cholesterol External      63.0 - 159.0 mg/dL 107.2   Hdl/Cholesterol Ratio      20.0 - 50.0 % 29.4   Total Cholesterol/HDL Ratio      2.0 - 5.0 3.4   Non-HDL Cholesterol      mg/dL 120     6. BPH with urinary obstruction  Chronic. Urinary symptoms present today. Followed by PCP.  Last seen by Urology 2016.  - URINALYSIS; Future    7. Vitamin D insufficiency  Chronic. Stable. Followed by PCP.  Component      Latest Ref Rng & Units 1/30/2017   Vit D, 25-Hydroxy      30 - 96 ng/mL 37       Provided Fadi with a 5-10 year written screening schedule and personal prevention plan. Recommendations were developed using the USPSTF age appropriate recommendations. Education, counseling, and referrals were provided as needed. After Visit Summary printed and given to patient which includes a list of additional screenings\tests needed.    Follow up for Annual Wellness Visit in 1 year, follow up with PCP as instructed, ;sooner if problems.    ZARA Angel

## 2019-09-05 NOTE — TELEPHONE ENCOUNTER
I tried calling pt. Twice. The first call, it rang once than had a busy signal. The second time message stated that the call cannot be completed at this time.

## 2019-10-16 ENCOUNTER — TELEPHONE (OUTPATIENT)
Dept: INTERNAL MEDICINE | Facility: CLINIC | Age: 79
End: 2019-10-16

## 2019-10-16 ENCOUNTER — OFFICE VISIT (OUTPATIENT)
Dept: INTERNAL MEDICINE | Facility: CLINIC | Age: 79
End: 2019-10-16
Payer: MEDICARE

## 2019-10-16 ENCOUNTER — LAB VISIT (OUTPATIENT)
Dept: LAB | Facility: HOSPITAL | Age: 79
End: 2019-10-16
Attending: INTERNAL MEDICINE
Payer: MEDICARE

## 2019-10-16 VITALS
HEART RATE: 57 BPM | SYSTOLIC BLOOD PRESSURE: 112 MMHG | WEIGHT: 157.19 LBS | DIASTOLIC BLOOD PRESSURE: 62 MMHG | HEIGHT: 67 IN | BODY MASS INDEX: 24.67 KG/M2 | OXYGEN SATURATION: 98 %

## 2019-10-16 DIAGNOSIS — D64.9 NORMOCYTIC ANEMIA: ICD-10-CM

## 2019-10-16 DIAGNOSIS — N40.1 BPH WITH URINARY OBSTRUCTION: ICD-10-CM

## 2019-10-16 DIAGNOSIS — E78.5 HYPERLIPIDEMIA, UNSPECIFIED HYPERLIPIDEMIA TYPE: ICD-10-CM

## 2019-10-16 DIAGNOSIS — N40.1 BPH WITH URINARY OBSTRUCTION: Primary | ICD-10-CM

## 2019-10-16 DIAGNOSIS — N13.8 BPH WITH URINARY OBSTRUCTION: ICD-10-CM

## 2019-10-16 DIAGNOSIS — N13.8 BPH WITH URINARY OBSTRUCTION: Primary | ICD-10-CM

## 2019-10-16 LAB
ALBUMIN SERPL BCP-MCNC: 3.9 G/DL (ref 3.5–5.2)
ALP SERPL-CCNC: 106 U/L (ref 55–135)
ALT SERPL W/O P-5'-P-CCNC: 18 U/L (ref 10–44)
ANION GAP SERPL CALC-SCNC: 7 MMOL/L (ref 8–16)
AST SERPL-CCNC: 20 U/L (ref 10–40)
BILIRUB SERPL-MCNC: 0.6 MG/DL (ref 0.1–1)
BUN SERPL-MCNC: 13 MG/DL (ref 8–23)
CALCIUM SERPL-MCNC: 9.2 MG/DL (ref 8.7–10.5)
CHLORIDE SERPL-SCNC: 106 MMOL/L (ref 95–110)
CHOLEST SERPL-MCNC: 218 MG/DL (ref 120–199)
CHOLEST/HDLC SERPL: 4 {RATIO} (ref 2–5)
CO2 SERPL-SCNC: 28 MMOL/L (ref 23–29)
COMPLEXED PSA SERPL-MCNC: 1.4 NG/ML (ref 0–4)
CREAT SERPL-MCNC: 0.9 MG/DL (ref 0.5–1.4)
ERYTHROCYTE [DISTWIDTH] IN BLOOD BY AUTOMATED COUNT: 14.6 % (ref 11.5–14.5)
EST. GFR  (AFRICAN AMERICAN): >60 ML/MIN/1.73 M^2
EST. GFR  (NON AFRICAN AMERICAN): >60 ML/MIN/1.73 M^2
FERRITIN SERPL-MCNC: 73 NG/ML (ref 20–300)
GLUCOSE SERPL-MCNC: 80 MG/DL (ref 70–110)
HCT VFR BLD AUTO: 37.9 % (ref 40–54)
HDLC SERPL-MCNC: 55 MG/DL (ref 40–75)
HDLC SERPL: 25.2 % (ref 20–50)
HGB BLD-MCNC: 12.4 G/DL (ref 14–18)
IRON SERPL-MCNC: 115 UG/DL (ref 45–160)
LDLC SERPL CALC-MCNC: 149 MG/DL (ref 63–159)
MCH RBC QN AUTO: 26.3 PG (ref 27–31)
MCHC RBC AUTO-ENTMCNC: 32.7 G/DL (ref 32–36)
MCV RBC AUTO: 81 FL (ref 82–98)
NONHDLC SERPL-MCNC: 163 MG/DL
PLATELET # BLD AUTO: 246 K/UL (ref 150–350)
PMV BLD AUTO: 10.3 FL (ref 9.2–12.9)
POTASSIUM SERPL-SCNC: 4.4 MMOL/L (ref 3.5–5.1)
PROT SERPL-MCNC: 7.7 G/DL (ref 6–8.4)
RBC # BLD AUTO: 4.71 M/UL (ref 4.6–6.2)
SATURATED IRON: 35 % (ref 20–50)
SODIUM SERPL-SCNC: 141 MMOL/L (ref 136–145)
TOTAL IRON BINDING CAPACITY: 324 UG/DL (ref 250–450)
TRANSFERRIN SERPL-MCNC: 219 MG/DL (ref 200–375)
TRIGL SERPL-MCNC: 70 MG/DL (ref 30–150)
WBC # BLD AUTO: 4.32 K/UL (ref 3.9–12.7)

## 2019-10-16 PROCEDURE — 82728 ASSAY OF FERRITIN: CPT

## 2019-10-16 PROCEDURE — 99214 OFFICE O/P EST MOD 30 MIN: CPT | Mod: S$PBB,,, | Performed by: INTERNAL MEDICINE

## 2019-10-16 PROCEDURE — 84153 ASSAY OF PSA TOTAL: CPT

## 2019-10-16 PROCEDURE — 99213 OFFICE O/P EST LOW 20 MIN: CPT | Mod: PBBFAC | Performed by: INTERNAL MEDICINE

## 2019-10-16 PROCEDURE — 99999 PR PBB SHADOW E&M-EST. PATIENT-LVL III: CPT | Mod: PBBFAC,,, | Performed by: INTERNAL MEDICINE

## 2019-10-16 PROCEDURE — 80061 LIPID PANEL: CPT

## 2019-10-16 PROCEDURE — 85027 COMPLETE CBC AUTOMATED: CPT

## 2019-10-16 PROCEDURE — 83540 ASSAY OF IRON: CPT

## 2019-10-16 PROCEDURE — 36415 COLL VENOUS BLD VENIPUNCTURE: CPT

## 2019-10-16 PROCEDURE — 80053 COMPREHEN METABOLIC PANEL: CPT

## 2019-10-16 PROCEDURE — 99999 PR PBB SHADOW E&M-EST. PATIENT-LVL III: ICD-10-PCS | Mod: PBBFAC,,, | Performed by: INTERNAL MEDICINE

## 2019-10-16 PROCEDURE — 99214 PR OFFICE/OUTPT VISIT, EST, LEVL IV, 30-39 MIN: ICD-10-PCS | Mod: S$PBB,,, | Performed by: INTERNAL MEDICINE

## 2019-10-16 RX ORDER — ATORVASTATIN CALCIUM 80 MG/1
80 TABLET, FILM COATED ORAL NIGHTLY
Qty: 90 TABLET | Refills: 3 | Status: SHIPPED | OUTPATIENT
Start: 2019-10-16 | End: 2021-02-05

## 2019-10-16 NOTE — PATIENT INSTRUCTIONS
For earwax, avoid Qtips or cotton swabs so that wax doesn't get pushed further in the ear. Instead use a few drops of mineral oil or olive oil to the ears; you can also purchase over-the-counter Debrox oil. After about 10 minutes, the wax should be loose and can be rinsed out with water from the shower or an over-the-counter syringe with warm water. If this doesn't work, please notify the office.     If you find that even after cleaning out the earwax there are problems with hearing, please notify the office so we can set you up with Audiology for a hearing test.

## 2019-10-16 NOTE — TELEPHONE ENCOUNTER
Please notify the patient that his cholesterol is high.  Today I sent in a new prescription for atorvastatin, he should restart taking this once daily to reduce his cholesterol levels.  All of his other lab work looks good.  I released the results to the patient portal, but it does not look like he has gotten on to the portal recently.  Please ask if he would like to be taken off of the patient portal and use phone calls and letters for results instead.    We will recheck the labs about a week before his visit next year.  No other follow-up is needed at this time.  Please sign and close this encounter once complete.

## 2019-10-16 NOTE — PROGRESS NOTES
"Subjective:       Patient ID: Fadi Higgins is a 79 y.o. male.    Chief Complaint: Annual Exam    HPI    Last visit with me July 2018.  Since then has been seen by Orthopedics for total hip replacement. Was having dysuria but resolved after antibiotics. No new problems or issues.    Reviewed PMH, PSH, SH, FH, allergies, and medications.     Review of Systems   All other systems reviewed and are negative.      Objective:      Physical Exam   Constitutional: He is oriented to person, place, and time. No distress.   HENT:   Head: Atraumatic.   Right Ear: Tympanic membrane normal. No tenderness.   Left Ear: Tympanic membrane normal. No tenderness.   Mouth/Throat: Oropharynx is clear and moist. No oropharyngeal exudate.   tympanic membrane obscured by cerumen bilaterally    Eyes: Pupils are equal, round, and reactive to light. Right eye exhibits no discharge. Left eye exhibits no discharge.   Neck: Normal range of motion. No thyromegaly present.   Cardiovascular: Normal rate, regular rhythm and normal heart sounds.   Pulmonary/Chest: Effort normal and breath sounds normal. No stridor. He has no wheezes. He has no rales.   Abdominal: Soft. There is no tenderness. There is no guarding.   Musculoskeletal: He exhibits no edema or tenderness.   Lymphadenopathy:     He has no cervical adenopathy.   Neurological: He is alert and oriented to person, place, and time.   Skin: Skin is warm and dry. No rash noted.   Psychiatric: He has a normal mood and affect. His behavior is normal.   Nursing note and vitals reviewed.      Vitals:    10/16/19 1015   BP: 112/62   BP Location: Right arm   Patient Position: Sitting   BP Method: Large (Manual)   Pulse: (!) 57   SpO2: 98%   Weight: 71.3 kg (157 lb 3 oz)   Height: 5' 7" (1.702 m)     Body mass index is 24.62 kg/m².    RESULTS: Reviewed labs from last 12 months    Assessment:       1. BPH with urinary obstruction    2. Hyperlipidemia, unspecified hyperlipidemia type    3. Normocytic " anemia        Plan:   Fadi was seen today for annual exam.    Diagnoses and all orders for this visit:    BPH with urinary obstruction:  Prior diagnosis, stable, well controlled on current management. No changes at this time, will continue to monitor.   -     CBC Without Differential; Future  -     Prostate Specific Antigen, Diagnostic; Future    Hyperlipidemia, unspecified hyperlipidemia type:  Prior diagnosis, stable, well controlled on current management. No changes at this time, will continue to monitor.   -     atorvastatin (LIPITOR) 80 MG tablet; Take 1 tablet (80 mg total) by mouth every evening.  -     Comprehensive metabolic panel; Future  -     Lipid panel; Future    Normocytic anemia:  Prior diagnosis, stable, well controlled on current management. No changes at this time, will continue to monitor.   -     Ferritin; Future  -     Iron and TIBC; Future    Follow up in about 1 year (around 10/16/2020) for follow up visit, fasting labs 1 week prior.  Ronald Finch MD  Internal Medicine    Portions of this note were completed using medical dictation software. Please excuse typographical or syntax errors that were missed on review.

## 2020-06-23 ENCOUNTER — PES CALL (OUTPATIENT)
Dept: ADMINISTRATIVE | Facility: CLINIC | Age: 80
End: 2020-06-23

## 2020-10-21 ENCOUNTER — LAB VISIT (OUTPATIENT)
Dept: LAB | Facility: HOSPITAL | Age: 80
End: 2020-10-21
Attending: INTERNAL MEDICINE
Payer: MEDICARE

## 2020-10-21 ENCOUNTER — OFFICE VISIT (OUTPATIENT)
Dept: INTERNAL MEDICINE | Facility: CLINIC | Age: 80
End: 2020-10-21
Payer: MEDICARE

## 2020-10-21 VITALS
HEIGHT: 67 IN | BODY MASS INDEX: 25.9 KG/M2 | DIASTOLIC BLOOD PRESSURE: 70 MMHG | HEART RATE: 55 BPM | WEIGHT: 165 LBS | SYSTOLIC BLOOD PRESSURE: 116 MMHG | OXYGEN SATURATION: 97 %

## 2020-10-21 DIAGNOSIS — E78.5 HYPERLIPIDEMIA, UNSPECIFIED HYPERLIPIDEMIA TYPE: ICD-10-CM

## 2020-10-21 DIAGNOSIS — E78.5 HYPERLIPIDEMIA, UNSPECIFIED HYPERLIPIDEMIA TYPE: Primary | ICD-10-CM

## 2020-10-21 DIAGNOSIS — Z12.9 CANCER SCREENING: ICD-10-CM

## 2020-10-21 DIAGNOSIS — M25.449 SWELLING OF JOINT OF HAND, UNSPECIFIED LATERALITY: ICD-10-CM

## 2020-10-21 DIAGNOSIS — H61.22 EXCESSIVE CERUMEN IN EAR CANAL, LEFT: ICD-10-CM

## 2020-10-21 DIAGNOSIS — I70.0 ABDOMINAL AORTIC ATHEROSCLEROSIS: ICD-10-CM

## 2020-10-21 LAB
ALBUMIN SERPL BCP-MCNC: 3.7 G/DL (ref 3.5–5.2)
ALP SERPL-CCNC: 100 U/L (ref 55–135)
ALT SERPL W/O P-5'-P-CCNC: 13 U/L (ref 10–44)
ANION GAP SERPL CALC-SCNC: 7 MMOL/L (ref 8–16)
AST SERPL-CCNC: 18 U/L (ref 10–40)
BILIRUB SERPL-MCNC: 0.5 MG/DL (ref 0.1–1)
BUN SERPL-MCNC: 13 MG/DL (ref 8–23)
CALCIUM SERPL-MCNC: 9 MG/DL (ref 8.7–10.5)
CCP AB SER IA-ACNC: <0.5 U/ML
CHLORIDE SERPL-SCNC: 107 MMOL/L (ref 95–110)
CHOLEST SERPL-MCNC: 228 MG/DL (ref 120–199)
CHOLEST/HDLC SERPL: 4.1 {RATIO} (ref 2–5)
CO2 SERPL-SCNC: 28 MMOL/L (ref 23–29)
CREAT SERPL-MCNC: 0.9 MG/DL (ref 0.5–1.4)
EST. GFR  (AFRICAN AMERICAN): >60 ML/MIN/1.73 M^2
EST. GFR  (NON AFRICAN AMERICAN): >60 ML/MIN/1.73 M^2
GLUCOSE SERPL-MCNC: 89 MG/DL (ref 70–110)
HDLC SERPL-MCNC: 55 MG/DL (ref 40–75)
HDLC SERPL: 24.1 % (ref 20–50)
LDLC SERPL CALC-MCNC: 149.4 MG/DL (ref 63–159)
NONHDLC SERPL-MCNC: 173 MG/DL
POTASSIUM SERPL-SCNC: 4.7 MMOL/L (ref 3.5–5.1)
PROT SERPL-MCNC: 7.5 G/DL (ref 6–8.4)
RHEUMATOID FACT SERPL-ACNC: <10 IU/ML (ref 0–15)
SODIUM SERPL-SCNC: 142 MMOL/L (ref 136–145)
TRIGL SERPL-MCNC: 118 MG/DL (ref 30–150)

## 2020-10-21 PROCEDURE — 99215 OFFICE O/P EST HI 40 MIN: CPT | Mod: S$PBB,,, | Performed by: INTERNAL MEDICINE

## 2020-10-21 PROCEDURE — 80061 LIPID PANEL: CPT

## 2020-10-21 PROCEDURE — 99214 OFFICE O/P EST MOD 30 MIN: CPT | Mod: PBBFAC | Performed by: INTERNAL MEDICINE

## 2020-10-21 PROCEDURE — 80053 COMPREHEN METABOLIC PANEL: CPT

## 2020-10-21 PROCEDURE — 86431 RHEUMATOID FACTOR QUANT: CPT

## 2020-10-21 PROCEDURE — 99999 PR PBB SHADOW E&M-EST. PATIENT-LVL IV: CPT | Mod: PBBFAC,,, | Performed by: INTERNAL MEDICINE

## 2020-10-21 PROCEDURE — 99999 PR PBB SHADOW E&M-EST. PATIENT-LVL IV: ICD-10-PCS | Mod: PBBFAC,,, | Performed by: INTERNAL MEDICINE

## 2020-10-21 PROCEDURE — 36415 COLL VENOUS BLD VENIPUNCTURE: CPT

## 2020-10-21 PROCEDURE — 99215 PR OFFICE/OUTPT VISIT, EST, LEVL V, 40-54 MIN: ICD-10-PCS | Mod: S$PBB,,, | Performed by: INTERNAL MEDICINE

## 2020-10-21 PROCEDURE — 86200 CCP ANTIBODY: CPT

## 2020-10-21 NOTE — PATIENT INSTRUCTIONS
If you start to have worsening or more bothersome urinary symptoms, please notify the office. We can try a prescription for tamsulosin (Flomax).    Please call [facility] P[facility]ing [facility] at [phone] or [phone] for more information about billing and financial questions. They should be able to answer questions about insurances accepted at [facility].    For earwax, avoid Qtips or cotton swabs so that wax doesn't get pushed further in the ear. Instead use a few drops of mineral oil or olive oil to the ear canal; you can also purchase over-the-counter Debrox oil. After about 10 minutes, the wax should be loose and can be rinsed out with water from the shower, or squirt water into the ear canal over the sink with an over-the-counter syringe with warm water. If this doesn't work, please notify the office.

## 2020-10-21 NOTE — PROGRESS NOTES
Chief Complaint  Chief Complaint   Patient presents with    Annual Exam       HPI  Fadi Higgins is a 80 y.o. male with multiple medical diagnoses as listed in the medical history and problem list that presents for his annual health maintenance exam.  Their last appointment with primary care was 10/16/2020.      Mr. Higgins reports that he has been staying active and well since his last visit. He states that he had intermittent burning pain in his stomach for the last couple of weeks and attributes this to taking a new formulation of sal palmetto that uses for mild symptoms of BPH. Patient says that his abdominal pain ceased with discontinuation of the sal palmetto. Pt declined further medical treatment for his BPH at this time.    Patient states that he has had arthritis in his hands for many years. States that he still has good functional mobility. He is constantly working with his hands doing jobs around the house. He endorses mild stiffness of the PIP and MCP joints that are relieved by keeping his hands active. He noted that his father had a history of rheumatoid arthritis.    PAST MEDICAL HISTORY:  Past Medical History:   Diagnosis Date    BPH (benign prostatic hyperplasia)     Chronic constipation     Hyperlipidemia     Osteoarthritis     Primary osteoarthritis of left hip 7/16/2018    Vitiligo        PAST SURGICAL HISTORY:  Past Surgical History:   Procedure Laterality Date    CATARACT EXTRACTION W/  INTRAOCULAR LENS IMPLANT  2011    [facility] w/ dr. bryant    EYE SURGERY      HIP ARTHROPLASTY Left 5/14/2019    Procedure: ARTHROPLASTY, HIP;  Surgeon: Miki Navarro MD;  [facility];  Service: Orthopedics;  Laterality: Left;    JOINT REPLACEMENT Left 05/14/2019    LUIS       SOCIAL HISTORY:  Social History     Socioeconomic History    Marital status:      Spouse name: Not on file    Number of children: Not on file    Years of education: Not on file    Highest education level: Not  on file   Occupational History    Occupation: retired [employer]    Social Needs    Financial resource strain: Not on file    Food insecurity     Worry: Not on file     Inability: Not on file    Transportation needs     Medical: Not on file     Non-medical: Not on file   Tobacco Use    Smoking status: Former Smoker     Packs/day: 1.00     Years: 5.00     Pack years: 5.00     Types: Cigarettes     Quit date: 1967     Years since quittin.1    Smokeless tobacco: Never Used   Substance and Sexual Activity    Alcohol use: No    Drug use: No    Sexual activity: Yes   Lifestyle    Physical activity     Days per week: Not on file     Minutes per session: Not on file    Stress: Not on file   Relationships    Social connections     Talks on phone: Not on file     Gets together: Not on file     Attends Mu-ism service: Not on file     Active member of club or organization: Not on file     Attends meetings of clubs or organizations: Not on file     Relationship status: Not on file   Other Topics Concern    Not on file   Social History Narrative    Not on file       FAMILY HISTORY:  Family History   Problem Relation Age of Onset    Kidney disease Mother         diabetic    Diabetes Mother     Glaucoma Mother     Blindness Mother     No Known Problems Father     Macular degeneration Neg Hx     Retinal detachment Neg Hx        ALLERGIES AND MEDICATIONS: updated and reviewed.  Review of patient's allergies indicates:  No Known Allergies  Current Outpatient Medications   Medication Sig Dispense Refill    atorvastatin (LIPITOR) 80 MG tablet Take 1 tablet (80 mg total) by mouth every evening. 90 tablet 3    cholecalciferol, vitamin D3, 2,000 unit Cap Take 1 capsule by mouth every evening.       saw palmetto 160 MG capsule Take 160 mg by mouth every evening.        No current facility-administered medications for this visit.          ROS  Review of Systems   Constitutional: Positive  "for unexpected weight change (mild weight gain since COVID).   HENT: Negative.    Eyes: Negative.    Respiratory: Negative.    Cardiovascular: Negative.    Gastrointestinal: Positive for abdominal pain. Negative for abdominal distention, anal bleeding, blood in stool, constipation, diarrhea, nausea, rectal pain and vomiting.   Endocrine: Negative.    Genitourinary: Negative for difficulty urinating, dysuria and enuresis.        Wakes up about 4x/night to urinate   Musculoskeletal: Positive for back pain and joint swelling (fingers).   Skin: Negative.    Allergic/Immunologic: Negative.    Neurological: Negative.    Hematological: Does not bruise/bleed easily.   Psychiatric/Behavioral: Negative.            Physical Exam  Vitals:    10/21/20 0925   BP: 116/70   BP Location: Right arm   Patient Position: Sitting   BP Method: Large (Manual)   Pulse: (!) 55   SpO2: 97%   Weight: 74.8 kg (165 lb)   Height: 5' 7" (1.702 m)    Body mass index is 25.84 kg/m².  Weight: 74.8 kg (165 lb)   Height: 5' 7" (170.2 cm)   Physical Exam  Constitutional:       Appearance: Normal appearance. He is normal weight.   HENT:      Head: Normocephalic and atraumatic.   Eyes:      Extraocular Movements: Extraocular movements intact.   Neck:      Musculoskeletal: Normal range of motion.   Cardiovascular:      Rate and Rhythm: Normal rate and regular rhythm.   Pulmonary:      Effort: Pulmonary effort is normal.      Breath sounds: Normal breath sounds.   Abdominal:      General: Bowel sounds are normal.      Palpations: Abdomen is soft.   Musculoskeletal:         General: Deformity present.        Arms:    Skin:     General: Skin is warm and dry.   Neurological:      Mental Status: He is alert and oriented to person, place, and time.   Psychiatric:         Mood and Affect: Mood normal.           Health Maintenance       Date Due Completion Date    Hepatitis C Screening 1940 ---    TETANUS VACCINE 08/20/1960 ---    Shingles Vaccine (1 of 2) " 08/20/1992 ---    Influenza Vaccine (1) 08/01/2020 1/27/2017    Lipid Panel 10/16/2020 10/16/2019            Assessment and Plan:  Hyperlipidemia, unspecified hyperlipidemia type  -     Lipid Panel; Future; Expected date: 10/21/2020  -     Comprehensive Metabolic Panel; Future; Expected date: 10/21/2020    Abdominal aortic atherosclerosis    Swelling of joint of hand, unspecified laterality  -     Cyclic Citrullinated Peptide Antibody, IgG; Future; Expected date: 10/21/2020  -     Rheumatoid Factor; Future; Expected date: 10/21/2020    Excessive cerumen in ear canal, left    Cancer screening        Addendum 10/21/2020 1:45 PM    I attest that I have reviewed the student note and that the components of the history of the present illness, the physical exam, and the assessment and plan documented were performed by me or were performed in my presence by the student where I verified the documentation and performed (or re-performed) the exam and medical decision making.    Continue currently regimen for treatment of atherosclerosis and hyperlipidemia. Check labs to rule out signs of Rheumatoid Arthritis.    Ronald Finch MD, FACP  [facility]

## 2021-01-07 ENCOUNTER — IMMUNIZATION (OUTPATIENT)
Dept: PRIMARY CARE CLINIC | Facility: CLINIC | Age: 81
End: 2021-01-07
Payer: MEDICARE

## 2021-01-07 DIAGNOSIS — Z23 NEED FOR VACCINATION: ICD-10-CM

## 2021-01-07 PROCEDURE — 91300 COVID-19, MRNA, LNP-S, PF, 30 MCG/0.3 ML DOSE VACCINE: CPT | Mod: PBBFAC | Performed by: FAMILY MEDICINE

## 2021-01-28 ENCOUNTER — IMMUNIZATION (OUTPATIENT)
Dept: PRIMARY CARE CLINIC | Facility: CLINIC | Age: 81
End: 2021-01-28
Payer: MEDICARE

## 2021-01-28 DIAGNOSIS — Z23 NEED FOR VACCINATION: Primary | ICD-10-CM

## 2021-01-28 PROCEDURE — 91300 COVID-19, MRNA, LNP-S, PF, 30 MCG/0.3 ML DOSE VACCINE: CPT | Mod: PBBFAC | Performed by: EMERGENCY MEDICINE

## 2021-01-28 PROCEDURE — 0002A COVID-19, MRNA, LNP-S, PF, 30 MCG/0.3 ML DOSE VACCINE: CPT | Mod: PBBFAC | Performed by: EMERGENCY MEDICINE

## 2021-02-05 DIAGNOSIS — E78.5 HYPERLIPIDEMIA, UNSPECIFIED HYPERLIPIDEMIA TYPE: ICD-10-CM

## 2021-02-05 RX ORDER — ATORVASTATIN CALCIUM 80 MG/1
TABLET, FILM COATED ORAL
Qty: 90 TABLET | Refills: 3 | Status: SHIPPED | OUTPATIENT
Start: 2021-02-05 | End: 2022-01-06

## 2021-05-07 ENCOUNTER — PATIENT OUTREACH (OUTPATIENT)
Dept: ADMINISTRATIVE | Facility: OTHER | Age: 81
End: 2021-05-07

## 2021-05-11 ENCOUNTER — LAB VISIT (OUTPATIENT)
Dept: LAB | Facility: HOSPITAL | Age: 81
End: 2021-05-11
Attending: UROLOGY
Payer: COMMERCIAL

## 2021-05-11 ENCOUNTER — OFFICE VISIT (OUTPATIENT)
Dept: UROLOGY | Facility: CLINIC | Age: 81
End: 2021-05-11
Payer: COMMERCIAL

## 2021-05-11 VITALS
BODY MASS INDEX: 25.64 KG/M2 | HEART RATE: 76 BPM | WEIGHT: 163.38 LBS | DIASTOLIC BLOOD PRESSURE: 71 MMHG | HEIGHT: 67 IN | SYSTOLIC BLOOD PRESSURE: 115 MMHG

## 2021-05-11 DIAGNOSIS — N32.81 OAB (OVERACTIVE BLADDER): ICD-10-CM

## 2021-05-11 DIAGNOSIS — N40.1 BPH WITH URINARY OBSTRUCTION: Primary | ICD-10-CM

## 2021-05-11 DIAGNOSIS — N40.1 BPH WITH URINARY OBSTRUCTION: ICD-10-CM

## 2021-05-11 DIAGNOSIS — N13.8 BPH WITH URINARY OBSTRUCTION: Primary | ICD-10-CM

## 2021-05-11 DIAGNOSIS — N13.8 BPH WITH URINARY OBSTRUCTION: ICD-10-CM

## 2021-05-11 DIAGNOSIS — R35.1 NOCTURIA: ICD-10-CM

## 2021-05-11 DIAGNOSIS — N52.9 ED (ERECTILE DYSFUNCTION) OF ORGANIC ORIGIN: ICD-10-CM

## 2021-05-11 LAB — COMPLEXED PSA SERPL-MCNC: 1.3 NG/ML (ref 0–4)

## 2021-05-11 PROCEDURE — 84153 ASSAY OF PSA TOTAL: CPT | Performed by: UROLOGY

## 2021-05-11 PROCEDURE — 99204 OFFICE O/P NEW MOD 45 MIN: CPT | Mod: S$GLB,,, | Performed by: UROLOGY

## 2021-05-11 PROCEDURE — 99204 PR OFFICE/OUTPT VISIT, NEW, LEVL IV, 45-59 MIN: ICD-10-PCS | Mod: S$GLB,,, | Performed by: UROLOGY

## 2021-05-11 PROCEDURE — 36415 COLL VENOUS BLD VENIPUNCTURE: CPT | Performed by: UROLOGY

## 2021-05-11 PROCEDURE — 99999 PR PBB SHADOW E&M-EST. PATIENT-LVL III: CPT | Mod: PBBFAC,,, | Performed by: UROLOGY

## 2021-05-11 PROCEDURE — 99999 PR PBB SHADOW E&M-EST. PATIENT-LVL III: ICD-10-PCS | Mod: PBBFAC,,, | Performed by: UROLOGY

## 2021-05-11 RX ORDER — TADALAFIL 20 MG/1
20 TABLET ORAL
Qty: 30 TABLET | Refills: 3 | Status: SHIPPED | OUTPATIENT
Start: 2021-05-11 | End: 2023-05-19

## 2021-08-17 ENCOUNTER — TELEPHONE (OUTPATIENT)
Dept: OPTOMETRY | Facility: CLINIC | Age: 81
End: 2021-08-17

## 2021-08-19 ENCOUNTER — OFFICE VISIT (OUTPATIENT)
Dept: OPTOMETRY | Facility: CLINIC | Age: 81
End: 2021-08-19
Payer: COMMERCIAL

## 2021-08-19 DIAGNOSIS — H40.013 OAG (OPEN ANGLE GLAUCOMA) SUSPECT, LOW RISK, BILATERAL: ICD-10-CM

## 2021-08-19 DIAGNOSIS — Z98.49 STATUS POST CATARACT EXTRACTION AND INSERTION OF INTRAOCULAR LENS, UNSPECIFIED LATERALITY: ICD-10-CM

## 2021-08-19 DIAGNOSIS — Z96.1 STATUS POST CATARACT EXTRACTION AND INSERTION OF INTRAOCULAR LENS, UNSPECIFIED LATERALITY: ICD-10-CM

## 2021-08-19 DIAGNOSIS — D31.32 CHOROIDAL NEVUS OF LEFT EYE: ICD-10-CM

## 2021-08-19 DIAGNOSIS — H53.10 SUBJECTIVE VISUAL DISTURBANCE: ICD-10-CM

## 2021-08-19 DIAGNOSIS — D31.02 NEVUS OF LEFT CONJUNCTIVA: ICD-10-CM

## 2021-08-19 DIAGNOSIS — H43.391 VITREOUS FLOATERS OF RIGHT EYE: Primary | ICD-10-CM

## 2021-08-19 PROCEDURE — 92250 COLOR FUNDUS PHOTOGRAPHY - OU - BOTH EYES: ICD-10-PCS | Mod: S$GLB,,, | Performed by: OPTOMETRIST

## 2021-08-19 PROCEDURE — 92004 COMPRE OPH EXAM NEW PT 1/>: CPT | Mod: S$GLB,,, | Performed by: OPTOMETRIST

## 2021-08-19 PROCEDURE — 92250 FUNDUS PHOTOGRAPHY W/I&R: CPT | Mod: S$GLB,,, | Performed by: OPTOMETRIST

## 2021-08-19 PROCEDURE — 99999 PR PBB SHADOW E&M-EST. PATIENT-LVL II: ICD-10-PCS | Mod: PBBFAC,,, | Performed by: OPTOMETRIST

## 2021-08-19 PROCEDURE — 99999 PR PBB SHADOW E&M-EST. PATIENT-LVL II: CPT | Mod: PBBFAC,,, | Performed by: OPTOMETRIST

## 2021-08-19 PROCEDURE — 92004 PR EYE EXAM, NEW PATIENT,COMPREHESV: ICD-10-PCS | Mod: S$GLB,,, | Performed by: OPTOMETRIST

## 2021-10-04 ENCOUNTER — IMMUNIZATION (OUTPATIENT)
Dept: INTERNAL MEDICINE | Facility: CLINIC | Age: 81
End: 2021-10-04
Payer: COMMERCIAL

## 2021-10-04 ENCOUNTER — OFFICE VISIT (OUTPATIENT)
Dept: OPHTHALMOLOGY | Facility: CLINIC | Age: 81
End: 2021-10-04
Payer: COMMERCIAL

## 2021-10-04 DIAGNOSIS — H53.10 SUBJECTIVE VISUAL DISTURBANCE OF BOTH EYES: ICD-10-CM

## 2021-10-04 DIAGNOSIS — D31.02 NEVUS OF LEFT CONJUNCTIVA: ICD-10-CM

## 2021-10-04 DIAGNOSIS — H40.013 OAG (OPEN ANGLE GLAUCOMA) SUSPECT, LOW RISK, BILATERAL: Primary | ICD-10-CM

## 2021-10-04 DIAGNOSIS — Z98.49 STATUS POST CATARACT EXTRACTION AND INSERTION OF INTRAOCULAR LENS, UNSPECIFIED LATERALITY: ICD-10-CM

## 2021-10-04 DIAGNOSIS — Z96.1 STATUS POST CATARACT EXTRACTION AND INSERTION OF INTRAOCULAR LENS, UNSPECIFIED LATERALITY: ICD-10-CM

## 2021-10-04 DIAGNOSIS — Z23 NEED FOR VACCINATION: Primary | ICD-10-CM

## 2021-10-04 PROCEDURE — 92133 POSTERIOR SEGMENT OCT OPTIC NERVE(OCULAR COHERENCE TOMOGRAPHY) - OU - BOTH EYES: ICD-10-PCS | Mod: S$GLB,,, | Performed by: OPHTHALMOLOGY

## 2021-10-04 PROCEDURE — 99999 PR PBB SHADOW E&M-EST. PATIENT-LVL II: CPT | Mod: PBBFAC,,, | Performed by: OPHTHALMOLOGY

## 2021-10-04 PROCEDURE — 99204 PR OFFICE/OUTPT VISIT, NEW, LEVL IV, 45-59 MIN: ICD-10-PCS | Mod: S$GLB,,, | Performed by: OPHTHALMOLOGY

## 2021-10-04 PROCEDURE — 92133 CPTRZD OPH DX IMG PST SGM ON: CPT | Mod: S$GLB,,, | Performed by: OPHTHALMOLOGY

## 2021-10-04 PROCEDURE — 91300 COVID-19, MRNA, LNP-S, PF, 30 MCG/0.3 ML DOSE VACCINE: CPT | Mod: PBBFAC | Performed by: INTERNAL MEDICINE

## 2021-10-04 PROCEDURE — 0003A COVID-19, MRNA, LNP-S, PF, 30 MCG/0.3 ML DOSE VACCINE: CPT | Mod: CV19,PBBFAC | Performed by: INTERNAL MEDICINE

## 2021-10-04 PROCEDURE — 99999 PR PBB SHADOW E&M-EST. PATIENT-LVL II: ICD-10-PCS | Mod: PBBFAC,,, | Performed by: OPHTHALMOLOGY

## 2021-10-04 PROCEDURE — 99204 OFFICE O/P NEW MOD 45 MIN: CPT | Mod: S$GLB,,, | Performed by: OPHTHALMOLOGY

## 2021-10-07 PROBLEM — H40.013 OAG (OPEN ANGLE GLAUCOMA) SUSPECT, LOW RISK, BILATERAL: Status: ACTIVE | Noted: 2021-10-07

## 2021-10-07 PROBLEM — H53.10 SUBJECTIVE VISUAL DISTURBANCE OF BOTH EYES: Status: ACTIVE | Noted: 2021-10-07

## 2021-10-23 ENCOUNTER — OFFICE VISIT (OUTPATIENT)
Dept: URGENT CARE | Facility: CLINIC | Age: 81
End: 2021-10-23
Payer: COMMERCIAL

## 2021-10-23 VITALS
OXYGEN SATURATION: 98 % | RESPIRATION RATE: 16 BRPM | WEIGHT: 160 LBS | DIASTOLIC BLOOD PRESSURE: 73 MMHG | SYSTOLIC BLOOD PRESSURE: 118 MMHG | BODY MASS INDEX: 25.11 KG/M2 | TEMPERATURE: 98 F | HEIGHT: 67 IN | HEART RATE: 63 BPM

## 2021-10-23 DIAGNOSIS — N39.0 URINARY TRACT INFECTION WITHOUT HEMATURIA, SITE UNSPECIFIED: Primary | ICD-10-CM

## 2021-10-23 DIAGNOSIS — R30.0 DYSURIA: ICD-10-CM

## 2021-10-23 LAB
BILIRUB UR QL STRIP: NEGATIVE
GLUCOSE UR QL STRIP: NEGATIVE
KETONES UR QL STRIP: NEGATIVE
LEUKOCYTE ESTERASE UR QL STRIP: POSITIVE
PH, POC UA: 5 (ref 5–8)
POC BLOOD, URINE: POSITIVE
POC NITRATES, URINE: POSITIVE
PROT UR QL STRIP: POSITIVE
SP GR UR STRIP: 1.02 (ref 1–1.03)
UROBILINOGEN UR STRIP-ACNC: ABNORMAL (ref 0.3–2.2)

## 2021-10-23 PROCEDURE — 87077 CULTURE AEROBIC IDENTIFY: CPT | Performed by: PHYSICIAN ASSISTANT

## 2021-10-23 PROCEDURE — 87088 URINE BACTERIA CULTURE: CPT | Performed by: PHYSICIAN ASSISTANT

## 2021-10-23 PROCEDURE — 87086 URINE CULTURE/COLONY COUNT: CPT | Performed by: PHYSICIAN ASSISTANT

## 2021-10-23 PROCEDURE — 87186 SC STD MICRODIL/AGAR DIL: CPT | Performed by: PHYSICIAN ASSISTANT

## 2021-10-23 PROCEDURE — 81003 POCT URINALYSIS, DIPSTICK, AUTOMATED, W/O SCOPE: ICD-10-PCS | Mod: QW,S$GLB,, | Performed by: PHYSICIAN ASSISTANT

## 2021-10-23 PROCEDURE — 99213 OFFICE O/P EST LOW 20 MIN: CPT | Mod: 25,S$GLB,, | Performed by: PHYSICIAN ASSISTANT

## 2021-10-23 PROCEDURE — 99213 PR OFFICE/OUTPT VISIT, EST, LEVL III, 20-29 MIN: ICD-10-PCS | Mod: 25,S$GLB,, | Performed by: PHYSICIAN ASSISTANT

## 2021-10-23 PROCEDURE — 81003 URINALYSIS AUTO W/O SCOPE: CPT | Mod: QW,S$GLB,, | Performed by: PHYSICIAN ASSISTANT

## 2021-10-23 RX ORDER — SULFAMETHOXAZOLE AND TRIMETHOPRIM 800; 160 MG/1; MG/1
1 TABLET ORAL 2 TIMES DAILY
Qty: 14 TABLET | Refills: 0 | Status: SHIPPED | OUTPATIENT
Start: 2021-10-23 | End: 2021-10-30

## 2021-10-26 ENCOUNTER — TELEPHONE (OUTPATIENT)
Dept: URGENT CARE | Facility: CLINIC | Age: 81
End: 2021-10-26
Payer: MEDICARE

## 2021-10-26 LAB — BACTERIA UR CULT: ABNORMAL

## 2021-10-27 ENCOUNTER — TELEPHONE (OUTPATIENT)
Dept: URGENT CARE | Facility: CLINIC | Age: 81
End: 2021-10-27
Payer: MEDICARE

## 2021-12-16 ENCOUNTER — TELEPHONE (OUTPATIENT)
Dept: INTERNAL MEDICINE | Facility: CLINIC | Age: 81
End: 2021-12-16
Payer: MEDICARE

## 2021-12-27 ENCOUNTER — TELEPHONE (OUTPATIENT)
Dept: INTERNAL MEDICINE | Facility: CLINIC | Age: 81
End: 2021-12-27
Payer: MEDICARE

## 2021-12-27 NOTE — TELEPHONE ENCOUNTER
----- Message from Danica Key sent at 12/27/2021  3:58 PM CST -----  Contact: Self   Patient is returning a phone call.  Who left a message for the patient: office  Does patient know what this is regarding:  appt  Would you like a call back, or a response through your [facility] portal?:   call back  Comments:  pt would like to speak to nurse. Pt is upset he has to reschedule. Please advise

## 2021-12-28 ENCOUNTER — OFFICE VISIT (OUTPATIENT)
Dept: INTERNAL MEDICINE | Facility: CLINIC | Age: 81
End: 2021-12-28
Payer: COMMERCIAL

## 2021-12-28 VITALS
HEIGHT: 67 IN | WEIGHT: 160.06 LBS | OXYGEN SATURATION: 97 % | DIASTOLIC BLOOD PRESSURE: 80 MMHG | SYSTOLIC BLOOD PRESSURE: 130 MMHG | HEART RATE: 55 BPM | BODY MASS INDEX: 25.12 KG/M2

## 2021-12-28 DIAGNOSIS — Z11.59 NEED FOR HEPATITIS C SCREENING TEST: ICD-10-CM

## 2021-12-28 DIAGNOSIS — Z96.642 HISTORY OF LEFT HIP REPLACEMENT: ICD-10-CM

## 2021-12-28 DIAGNOSIS — Z01.89 LABORATORY EXAMINATION: ICD-10-CM

## 2021-12-28 DIAGNOSIS — Z00.00 ENCOUNTER FOR ANNUAL PHYSICAL EXAM: Primary | ICD-10-CM

## 2021-12-28 DIAGNOSIS — M25.552 LEFT HIP PAIN: ICD-10-CM

## 2021-12-28 DIAGNOSIS — Z12.11 COLON CANCER SCREENING: ICD-10-CM

## 2021-12-28 DIAGNOSIS — N52.9 ERECTILE DYSFUNCTION, UNSPECIFIED ERECTILE DYSFUNCTION TYPE: ICD-10-CM

## 2021-12-28 DIAGNOSIS — R73.09 ELEVATED RANDOM BLOOD GLUCOSE LEVEL: ICD-10-CM

## 2021-12-28 DIAGNOSIS — I70.0 ABDOMINAL AORTIC ATHEROSCLEROSIS: ICD-10-CM

## 2021-12-28 DIAGNOSIS — R03.0 ELEVATED BLOOD PRESSURE READING: ICD-10-CM

## 2021-12-28 DIAGNOSIS — H61.22 EXCESSIVE CERUMEN IN EAR CANAL, LEFT: ICD-10-CM

## 2021-12-28 DIAGNOSIS — Z12.5 PROSTATE CANCER SCREENING: ICD-10-CM

## 2021-12-28 DIAGNOSIS — E78.5 HYPERLIPIDEMIA, UNSPECIFIED HYPERLIPIDEMIA TYPE: ICD-10-CM

## 2021-12-28 PROCEDURE — 99214 PR OFFICE/OUTPT VISIT, EST, LEVL IV, 30-39 MIN: ICD-10-PCS | Mod: S$GLB,,, | Performed by: INTERNAL MEDICINE

## 2021-12-28 PROCEDURE — 99999 PR PBB SHADOW E&M-EST. PATIENT-LVL III: CPT | Mod: PBBFAC,,, | Performed by: INTERNAL MEDICINE

## 2021-12-28 PROCEDURE — 99999 PR PBB SHADOW E&M-EST. PATIENT-LVL III: ICD-10-PCS | Mod: PBBFAC,,, | Performed by: INTERNAL MEDICINE

## 2021-12-28 PROCEDURE — 99214 OFFICE O/P EST MOD 30 MIN: CPT | Mod: S$GLB,,, | Performed by: INTERNAL MEDICINE

## 2021-12-28 RX ORDER — DICLOFENAC SODIUM 10 MG/G
2 GEL TOPICAL DAILY
Qty: 200 G | Refills: 5 | Status: SHIPPED | OUTPATIENT
Start: 2021-12-28 | End: 2023-05-19 | Stop reason: SDUPTHER

## 2021-12-28 NOTE — PATIENT INSTRUCTIONS
Labwork today  Flu shot today   Voltaren Gel over the counter    Colonoscopy - a colonoscopy has been ordered for you. In order to schedule this appointment, please call [phone].     Immunizations  [   ] flu shot  [   ] shingles #1  [   ] shingles #2 (2-6 months after #1)  [   ] tetanus shot     Return to clinic in 6 months or sooner if needed   Normal for race

## 2022-01-03 ENCOUNTER — TELEPHONE (OUTPATIENT)
Dept: INTERNAL MEDICINE | Facility: CLINIC | Age: 82
End: 2022-01-03
Payer: MEDICARE

## 2022-01-03 DIAGNOSIS — Z11.59 NEED FOR HEPATITIS C SCREENING TEST: ICD-10-CM

## 2022-01-03 DIAGNOSIS — Z01.89 LABORATORY EXAMINATION: Primary | ICD-10-CM

## 2022-01-03 DIAGNOSIS — Z12.5 PROSTATE CANCER SCREENING: ICD-10-CM

## 2022-01-03 DIAGNOSIS — E78.5 HYPERLIPIDEMIA, UNSPECIFIED HYPERLIPIDEMIA TYPE: ICD-10-CM

## 2022-01-03 DIAGNOSIS — R03.0 ELEVATED BLOOD PRESSURE READING: ICD-10-CM

## 2022-01-03 NOTE — TELEPHONE ENCOUNTER
----- Message from Mi Maier sent at 1/3/2022  1:57 PM CST -----  Contact: PT - [phone]  Caller: PT    Reason: requesting his blood work orders to be resubmitted as when he came on 12/28 - he had to leave prior to getting blood work     blood work needed:  CBC Auto Differential [ZIF0564]  Comprehensive Metabolic Panel [LAB17]  Lipid Panel [LAB18]  Hemoglobin A1C [LAB90]  TSH [EUJ505]  PSA, Screening [CAK603]  Hepatitis C Antibody [OFH239]    also requesting xray order for his hip / regarding hard fall, he fell backwards and hit his head / provider is aware of this situation / hip has been hurting every since - pt has had hip replacement surgery in 2018     Callback: PT - [phone]

## 2022-01-03 NOTE — TELEPHONE ENCOUNTER
Lab orders pending---and also requesting xray order for his hip / regarding hard fall, he fell backwards and hit his head / provider is aware of this situation / hip has been hurting every since - pt has had hip replacement surgery in 2018

## 2022-01-06 ENCOUNTER — LAB VISIT (OUTPATIENT)
Dept: LAB | Facility: HOSPITAL | Age: 82
End: 2022-01-06
Attending: INTERNAL MEDICINE
Payer: COMMERCIAL

## 2022-01-06 DIAGNOSIS — Z12.5 PROSTATE CANCER SCREENING: ICD-10-CM

## 2022-01-06 DIAGNOSIS — R03.0 ELEVATED BLOOD PRESSURE READING: ICD-10-CM

## 2022-01-06 DIAGNOSIS — Z11.59 NEED FOR HEPATITIS C SCREENING TEST: ICD-10-CM

## 2022-01-06 DIAGNOSIS — E78.5 HYPERLIPIDEMIA, UNSPECIFIED HYPERLIPIDEMIA TYPE: ICD-10-CM

## 2022-01-06 LAB
ALBUMIN SERPL BCP-MCNC: 3.6 G/DL (ref 3.5–5.2)
ALP SERPL-CCNC: 100 U/L (ref 55–135)
ALT SERPL W/O P-5'-P-CCNC: 15 U/L (ref 10–44)
ANION GAP SERPL CALC-SCNC: 8 MMOL/L (ref 8–16)
AST SERPL-CCNC: 19 U/L (ref 10–40)
BASOPHILS # BLD AUTO: 0.06 K/UL (ref 0–0.2)
BASOPHILS NFR BLD: 1.3 % (ref 0–1.9)
BILIRUB SERPL-MCNC: 0.5 MG/DL (ref 0.1–1)
BUN SERPL-MCNC: 13 MG/DL (ref 8–23)
CALCIUM SERPL-MCNC: 9.1 MG/DL (ref 8.7–10.5)
CHLORIDE SERPL-SCNC: 106 MMOL/L (ref 95–110)
CHOLEST SERPL-MCNC: 224 MG/DL (ref 120–199)
CHOLEST/HDLC SERPL: 4.7 {RATIO} (ref 2–5)
CO2 SERPL-SCNC: 24 MMOL/L (ref 23–29)
COMPLEXED PSA SERPL-MCNC: 1.8 NG/ML (ref 0–4)
CREAT SERPL-MCNC: 0.9 MG/DL (ref 0.5–1.4)
DIFFERENTIAL METHOD: ABNORMAL
EOSINOPHIL # BLD AUTO: 0.1 K/UL (ref 0–0.5)
EOSINOPHIL NFR BLD: 2.6 % (ref 0–8)
ERYTHROCYTE [DISTWIDTH] IN BLOOD BY AUTOMATED COUNT: 13.1 % (ref 11.5–14.5)
EST. GFR  (AFRICAN AMERICAN): >60 ML/MIN/1.73 M^2
EST. GFR  (NON AFRICAN AMERICAN): >60 ML/MIN/1.73 M^2
ESTIMATED AVG GLUCOSE: 111 MG/DL (ref 68–131)
GLUCOSE SERPL-MCNC: 86 MG/DL (ref 70–110)
HBA1C MFR BLD: 5.5 % (ref 4–5.6)
HCT VFR BLD AUTO: 40.9 % (ref 40–54)
HCV AB SERPL QL IA: NEGATIVE
HDLC SERPL-MCNC: 48 MG/DL (ref 40–75)
HDLC SERPL: 21.4 % (ref 20–50)
HGB BLD-MCNC: 12.7 G/DL (ref 14–18)
IMM GRANULOCYTES # BLD AUTO: 0.02 K/UL (ref 0–0.04)
IMM GRANULOCYTES NFR BLD AUTO: 0.4 % (ref 0–0.5)
LDLC SERPL CALC-MCNC: 156 MG/DL (ref 63–159)
LYMPHOCYTES # BLD AUTO: 1.2 K/UL (ref 1–4.8)
LYMPHOCYTES NFR BLD: 26.4 % (ref 18–48)
MCH RBC QN AUTO: 27.4 PG (ref 27–31)
MCHC RBC AUTO-ENTMCNC: 31.1 G/DL (ref 32–36)
MCV RBC AUTO: 88 FL (ref 82–98)
MONOCYTES # BLD AUTO: 0.6 K/UL (ref 0.3–1)
MONOCYTES NFR BLD: 11.7 % (ref 4–15)
NEUTROPHILS # BLD AUTO: 2.7 K/UL (ref 1.8–7.7)
NEUTROPHILS NFR BLD: 57.6 % (ref 38–73)
NONHDLC SERPL-MCNC: 176 MG/DL
NRBC BLD-RTO: 0 /100 WBC
PLATELET # BLD AUTO: 261 K/UL (ref 150–450)
PMV BLD AUTO: 10.3 FL (ref 9.2–12.9)
POTASSIUM SERPL-SCNC: 4.6 MMOL/L (ref 3.5–5.1)
PROT SERPL-MCNC: 7.2 G/DL (ref 6–8.4)
RBC # BLD AUTO: 4.63 M/UL (ref 4.6–6.2)
SODIUM SERPL-SCNC: 138 MMOL/L (ref 136–145)
TRIGL SERPL-MCNC: 100 MG/DL (ref 30–150)
TSH SERPL DL<=0.005 MIU/L-ACNC: 1.68 UIU/ML (ref 0.4–4)
WBC # BLD AUTO: 4.69 K/UL (ref 3.9–12.7)

## 2022-01-06 PROCEDURE — 80053 COMPREHEN METABOLIC PANEL: CPT | Performed by: INTERNAL MEDICINE

## 2022-01-06 PROCEDURE — 85025 COMPLETE CBC W/AUTO DIFF WBC: CPT | Performed by: INTERNAL MEDICINE

## 2022-01-06 PROCEDURE — 86803 HEPATITIS C AB TEST: CPT | Performed by: INTERNAL MEDICINE

## 2022-01-06 PROCEDURE — 84443 ASSAY THYROID STIM HORMONE: CPT | Performed by: INTERNAL MEDICINE

## 2022-01-06 PROCEDURE — 80061 LIPID PANEL: CPT | Performed by: INTERNAL MEDICINE

## 2022-01-06 PROCEDURE — 83036 HEMOGLOBIN GLYCOSYLATED A1C: CPT | Performed by: INTERNAL MEDICINE

## 2022-01-06 PROCEDURE — 36415 COLL VENOUS BLD VENIPUNCTURE: CPT | Performed by: INTERNAL MEDICINE

## 2022-01-06 PROCEDURE — 84153 ASSAY OF PSA TOTAL: CPT | Performed by: INTERNAL MEDICINE

## 2022-01-06 RX ORDER — ROSUVASTATIN CALCIUM 20 MG/1
20 TABLET, COATED ORAL NIGHTLY
Qty: 90 TABLET | Refills: 3 | Status: SHIPPED | OUTPATIENT
Start: 2022-01-06 | End: 2023-03-13

## 2022-01-07 NOTE — PROGRESS NOTES
Subjective:       Patient ID: Fadi Higgins is a 81 y.o. male.    Chief Complaint: Fall      HPI  Fadi Higgins is a 81 y.o. year old male with history of primary osteoarthritis of L hip s/p LUIS, HLD, OA, BPH, vitiligo presents for establishment of care. Recent fall due to pain, no LOC. Doing well otherwise, no new complaints. Pain is unchanged from previous.    Review of Systems   Constitutional: Negative for activity change, appetite change, chills, fatigue, fever and unexpected weight change.   HENT: Negative for congestion, rhinorrhea and sore throat.    Eyes: Negative for visual disturbance.   Respiratory: Negative for shortness of breath.    Cardiovascular: Negative for chest pain.   Gastrointestinal: Negative for abdominal pain, diarrhea, nausea and vomiting.   Genitourinary: Negative for difficulty urinating and dysuria.   Musculoskeletal: Positive for arthralgias and gait problem. Negative for back pain and myalgias.   Skin: Negative for color change and rash.   Neurological: Negative for dizziness, weakness and headaches.         Past Medical History:   Diagnosis Date    BPH (benign prostatic hyperplasia)     Chronic constipation     Hyperlipidemia     Osteoarthritis     Primary osteoarthritis of left hip 7/16/2018    Vitiligo         Prior to Admission medications    Medication Sig Start Date End Date Taking? Authorizing Provider   saw palmetto 160 MG capsule Take 160 mg by mouth every evening.    Yes Historical Provider   cholecalciferol, vitamin D3, 2,000 unit Cap Take 1 capsule by mouth every evening.     Historical Provider   diclofenac sodium (VOLTAREN) 1 % Gel Apply 2 g topically once daily. 12/28/21   Scott Joyner MD   tadalafiL (CIALIS) 20 MG Tab Take 1 tablet (20 mg total) by mouth as needed (for ED). Generic cialis ( Tadalafil)  Patient not taking: No sig reported 5/11/21 5/11/22  Mickey Hendrix MD        Past medical history, surgical history, and family medical history reviewed  "and updated as appropriate.    Medications and allergies reviewed.     Objective:          Vitals:    12/28/21 1511   BP: 130/80   BP Location: Right arm   Patient Position: Sitting   Pulse: (!) 55   SpO2: 97%   Weight: 72.6 kg (160 lb 0.9 oz)   Height: 5' 7" (1.702 m)     Body mass index is 25.07 kg/m².  Physical Exam  Constitutional:       General: He is not in acute distress.     Appearance: He is well-developed.   HENT:      Head: Normocephalic and atraumatic.      Nose: Nose normal.   Eyes:      General: No scleral icterus.     Extraocular Movements: Extraocular movements intact.   Neck:      Thyroid: No thyromegaly.      Vascular: No JVD.      Trachea: No tracheal deviation.   Cardiovascular:      Rate and Rhythm: Normal rate and regular rhythm.      Heart sounds: Normal heart sounds. No murmur heard.    No friction rub. No gallop.   Pulmonary:      Effort: Pulmonary effort is normal. No respiratory distress.      Breath sounds: Normal breath sounds. No wheezing or rales.   Abdominal:      General: Bowel sounds are normal. There is no distension.      Palpations: Abdomen is soft. There is no mass.      Tenderness: There is no abdominal tenderness.   Musculoskeletal:         General: Tenderness (L hip, mild) present. No deformity.      Cervical back: Normal range of motion and neck supple.   Lymphadenopathy:      Cervical: No cervical adenopathy.   Skin:     General: Skin is warm and dry.      Findings: No rash.   Neurological:      Mental Status: He is alert and oriented to person, place, and time.      Cranial Nerves: No cranial nerve deficit.      Deep Tendon Reflexes: Reflexes normal.   Psychiatric:         Behavior: Behavior normal.     Labwork from 10/2020 reviewed.    Assessment:       1. Encounter for annual physical exam    2. Laboratory examination    3. Prostate cancer screening    4. Colon cancer screening    5. Hyperlipidemia, unspecified hyperlipidemia type    6. Left hip pain    7. Erectile " dysfunction, unspecified erectile dysfunction type    8. Elevated blood pressure reading    9. Elevated random blood glucose level    10. Need for hepatitis C screening test    11. Abdominal aortic atherosclerosis    12. Excessive cerumen in ear canal, left    13. History of left hip replacement          Plan:     Fadi was seen today for fall.    Diagnoses and all orders for this visit:    Encounter for annual physical exam    Laboratory examination    Prostate cancer screening  -     PSA, Screening; Future    Colon cancer screening  -     Case Request Endoscopy: COLONOSCOPY    Hyperlipidemia, unspecified hyperlipidemia type  -     Lipid Panel; Future    Left hip pain  -     diclofenac sodium (VOLTAREN) 1 % Gel; Apply 2 g topically once daily.    Erectile dysfunction, unspecified erectile dysfunction type    Elevated blood pressure reading  -     CBC Auto Differential; Future  -     Comprehensive Metabolic Panel; Future  -     TSH; Future    Elevated random blood glucose level  -     Hemoglobin A1C; Future    Need for hepatitis C screening test  -     Hepatitis C Antibody; Future    Abdominal aortic atherosclerosis    Excessive cerumen in ear canal, left    History of left hip replacement    Benign physical examination, no issues identified. Will obtain routine labwork and age appropriate health screenings.     Health maintenance reviewed with patient. Patient is due for immunizations, colonoscopy. List of immunizations provided, pt to get flu shot on the way out of clinic today.    Follow up in about 6 months (around 6/28/2022).    Scott Joyner MD  Internal Medicine / Primary Care  [facility]  12/28/2021

## 2022-02-15 DIAGNOSIS — Z01.818 PRE-OP TESTING: ICD-10-CM

## 2022-02-15 DIAGNOSIS — Z12.11 SPECIAL SCREENING FOR MALIGNANT NEOPLASMS, COLON: Primary | ICD-10-CM

## 2022-02-15 RX ORDER — POLYETHYLENE GLYCOL 3350, SODIUM SULFATE ANHYDROUS, SODIUM BICARBONATE, SODIUM CHLORIDE, POTASSIUM CHLORIDE 236; 22.74; 6.74; 5.86; 2.97 G/4L; G/4L; G/4L; G/4L; G/4L
4 POWDER, FOR SOLUTION ORAL ONCE
Qty: 4000 ML | Refills: 0 | Status: SHIPPED | OUTPATIENT
Start: 2022-02-15 | End: 2022-02-15

## 2022-04-01 ENCOUNTER — LAB VISIT (OUTPATIENT)
Dept: FAMILY MEDICINE | Facility: CLINIC | Age: 82
End: 2022-04-01
Payer: MEDICARE

## 2022-04-01 DIAGNOSIS — Z01.818 PRE-OP TESTING: ICD-10-CM

## 2022-04-01 PROCEDURE — U0003 INFECTIOUS AGENT DETECTION BY NUCLEIC ACID (DNA OR RNA); SEVERE ACUTE RESPIRATORY SYNDROME CORONAVIRUS 2 (SARS-COV-2) (CORONAVIRUS DISEASE [COVID-19]), AMPLIFIED PROBE TECHNIQUE, MAKING USE OF HIGH THROUGHPUT TECHNOLOGIES AS DESCRIBED BY CMS-2020-01-R: HCPCS | Performed by: CLINICAL NURSE SPECIALIST

## 2022-04-01 PROCEDURE — U0005 INFEC AGEN DETEC AMPLI PROBE: HCPCS | Performed by: CLINICAL NURSE SPECIALIST

## 2022-04-02 LAB
SARS-COV-2 RNA RESP QL NAA+PROBE: NOT DETECTED
SARS-COV-2- CYCLE NUMBER: NORMAL

## 2022-04-04 ENCOUNTER — ANESTHESIA (OUTPATIENT)
Dept: ENDOSCOPY | Facility: HOSPITAL | Age: 82
End: 2022-04-04
Payer: MEDICARE

## 2022-04-04 ENCOUNTER — ANESTHESIA EVENT (OUTPATIENT)
Dept: ENDOSCOPY | Facility: HOSPITAL | Age: 82
End: 2022-04-04
Payer: MEDICARE

## 2022-04-04 ENCOUNTER — HOSPITAL ENCOUNTER (OUTPATIENT)
Facility: HOSPITAL | Age: 82
Discharge: HOME | End: 2022-04-04
Attending: COLON & RECTAL SURGERY | Admitting: COLON & RECTAL SURGERY
Payer: MEDICARE

## 2022-04-04 VITALS
TEMPERATURE: 97 F | BODY MASS INDEX: 24.33 KG/M2 | SYSTOLIC BLOOD PRESSURE: 143 MMHG | WEIGHT: 155 LBS | RESPIRATION RATE: 16 BRPM | HEART RATE: 62 BPM | OXYGEN SATURATION: 98 % | HEIGHT: 67 IN | DIASTOLIC BLOOD PRESSURE: 75 MMHG

## 2022-04-04 DIAGNOSIS — Z12.11 SCREENING FOR MALIGNANT NEOPLASM OF COLON: Primary | ICD-10-CM

## 2022-04-04 PROCEDURE — 37000009 HC ANESTHESIA EA ADD 15 MINS: Performed by: COLON & RECTAL SURGERY

## 2022-04-04 PROCEDURE — 88305 TISSUE EXAM BY PATHOLOGIST: CPT | Mod: 26,,, | Performed by: PATHOLOGY

## 2022-04-04 PROCEDURE — 25000003 PHARM REV CODE 250: Performed by: COLON & RECTAL SURGERY

## 2022-04-04 PROCEDURE — 45381 COLONOSCOPY SUBMUCOUS NJX: CPT | Mod: 51 | Performed by: COLON & RECTAL SURGERY

## 2022-04-04 PROCEDURE — 27201089 HC SNARE, DISP (ANY): Performed by: COLON & RECTAL SURGERY

## 2022-04-04 PROCEDURE — 45385 COLONOSCOPY W/LESION REMOVAL: CPT | Mod: PT,,, | Performed by: COLON & RECTAL SURGERY

## 2022-04-04 PROCEDURE — 45381 COLONOSCOPY SUBMUCOUS NJX: CPT | Mod: 51,,, | Performed by: COLON & RECTAL SURGERY

## 2022-04-04 PROCEDURE — 25000003 PHARM REV CODE 250: Performed by: NURSE ANESTHETIST, CERTIFIED REGISTERED

## 2022-04-04 PROCEDURE — 45381 PR COLONOSCPY,FLEX,W/DIR SUBMUC INJECT: ICD-10-PCS | Mod: 51,,, | Performed by: COLON & RECTAL SURGERY

## 2022-04-04 PROCEDURE — 63600175 PHARM REV CODE 636 W HCPCS: Performed by: NURSE ANESTHETIST, CERTIFIED REGISTERED

## 2022-04-04 PROCEDURE — 45385 PR COLONOSCOPY,REMV LESN,SNARE: ICD-10-PCS | Mod: PT,,, | Performed by: COLON & RECTAL SURGERY

## 2022-04-04 PROCEDURE — E9220 PRA ENDO ANESTHESIA: ICD-10-PCS | Mod: PT,,, | Performed by: NURSE ANESTHETIST, CERTIFIED REGISTERED

## 2022-04-04 PROCEDURE — E9220 PRA ENDO ANESTHESIA: HCPCS | Mod: PT,,, | Performed by: NURSE ANESTHETIST, CERTIFIED REGISTERED

## 2022-04-04 PROCEDURE — 37000008 HC ANESTHESIA 1ST 15 MINUTES: Performed by: COLON & RECTAL SURGERY

## 2022-04-04 PROCEDURE — 88305 TISSUE EXAM BY PATHOLOGIST: CPT | Performed by: PATHOLOGY

## 2022-04-04 PROCEDURE — 88305 TISSUE EXAM BY PATHOLOGIST: ICD-10-PCS | Mod: 26,,, | Performed by: PATHOLOGY

## 2022-04-04 PROCEDURE — 45385 COLONOSCOPY W/LESION REMOVAL: CPT | Mod: PT | Performed by: COLON & RECTAL SURGERY

## 2022-04-04 RX ORDER — LIDOCAINE HYDROCHLORIDE 20 MG/ML
INJECTION INTRAVENOUS
Status: DISCONTINUED | OUTPATIENT
Start: 2022-04-04 | End: 2022-04-04

## 2022-04-04 RX ORDER — PROPOFOL 10 MG/ML
VIAL (ML) INTRAVENOUS
Status: DISCONTINUED | OUTPATIENT
Start: 2022-04-04 | End: 2022-04-04

## 2022-04-04 RX ORDER — PHENYLEPHRINE HYDROCHLORIDE 10 MG/ML
INJECTION INTRAVENOUS
Status: DISCONTINUED | OUTPATIENT
Start: 2022-04-04 | End: 2022-04-04

## 2022-04-04 RX ORDER — SODIUM CHLORIDE 9 MG/ML
INJECTION, SOLUTION INTRAVENOUS CONTINUOUS
Status: DISCONTINUED | OUTPATIENT
Start: 2022-04-04 | End: 2022-04-04 | Stop reason: HOSPADM

## 2022-04-04 RX ADMIN — PROPOFOL 50 MG: 10 INJECTION, EMULSION INTRAVENOUS at 07:04

## 2022-04-04 RX ADMIN — GLYCOPYRROLATE 0.2 MG: 0.2 INJECTION, SOLUTION INTRAMUSCULAR; INTRAVITREAL at 07:04

## 2022-04-04 RX ADMIN — PHENYLEPHRINE HYDROCHLORIDE 200 MCG: 10 INJECTION INTRAVENOUS at 07:04

## 2022-04-04 RX ADMIN — LIDOCAINE HYDROCHLORIDE 50 MG: 20 INJECTION, SOLUTION INTRAVENOUS at 07:04

## 2022-04-04 RX ADMIN — SODIUM CHLORIDE: 0.9 INJECTION, SOLUTION INTRAVENOUS at 06:04

## 2022-04-04 RX ADMIN — PROPOFOL 100 MG: 10 INJECTION, EMULSION INTRAVENOUS at 07:04

## 2022-04-04 NOTE — PROVATION PATIENT INSTRUCTIONS
Discharge Summary/Instructions after an Endoscopic Procedure  Patient Name: Fadi Higgins  Patient MRN: [id number]  Patient YOB: 1940 Monday, April 4, 2022  Lauren Rodriguez MD  Dear patient,  As a result of recent federal legislation (The Federal Cures Act), you may   receive lab or pathology results from your procedure in your [facility]   account before your physician is able to contact you. Your physician or   their representative will relay the results to you with their   recommendations at their soonest availability.  Thank you,  RESTRICTIONS:  During your procedure today, you received medications for sedation.  These   medications may affect your judgment, balance and coordination.  Therefore,   for 24 hours, you have the following restrictions:   - DO NOT drive a car, operate machinery, make legal/financial decisions,   sign important papers or drink alcohol.    ACTIVITY:  Today: no heavy lifting, straining or running due to procedural   sedation/anesthesia.  The following day: return to full activity including work.  DIET:  Eat and drink normally unless instructed otherwise.     TREATMENT FOR COMMON SIDE EFFECTS:  - Mild abdominal pain, nausea, belching, bloating or excessive gas:  rest,   eat lightly and use a heating pad.  - Sore Throat: treat with throat lozenges and/or gargle with warm salt   water.  - Because air was used during the procedure, expelling large amounts of air   from your rectum or belching is normal.  - If a bowel prep was taken, you may not have a bowel movement for 1-3 days.    This is normal.  SYMPTOMS TO WATCH FOR AND REPORT TO YOUR PHYSICIAN:  1. Abdominal pain or bloating, other than gas cramps.  2. Chest pain.  3. Back pain.  4. Signs of infection such as: chills or fever occurring within 24 hours   after the procedure.  5. Rectal bleeding, which would show as bright red, maroon, or black stools.   (A tablespoon of blood from the rectum is not serious, especially  if   hemorrhoids are present.)  6. Vomiting.  7. Weakness or dizziness.  GO DIRECTLY TO THE NEAREST EMERGENCY ROOM IF YOU HAVE ANY OF THE FOLLOWING:      Difficulty breathing              Chills and/or fever over 101 F   Persistent vomiting and/or vomiting blood   Severe abdominal pain   Severe chest pain   Black, tarry stools   Bleeding- more than one tablespoon   Any other symptom or condition that you feel may need urgent attention  Your doctor recommends these additional instructions:  If any biopsies were taken, your doctors clinic will contact you in 1 to 2   weeks with any results.  - Discharge patient to home.   - Resume previous diet.   - Continue present medications.   - Await pathology results.   - Repeat colonoscopy in 6 months for surveillance because of piecemeal EMR   of polyp.   - Return to primary care physician.   - Written discharge instructions were provided to the patient.   - The signs and symptoms of potential delayed complications were discussed   with the patient.   - Patient has a contact number available for emergencies.   - Return to normal activities tomorrow.  For questions, problems or results please call your physician - Lauren Rodriguez MD at Work:  [phone].  [facility], EMERGENCY ROOM PHONE NUMBER: [phone]  IF A COMPLICATION OR EMERGENCY SITUATION ARISES AND YOU ARE UNABLE TO REACH   YOUR PHYSICIAN - GO DIRECTLY TO THE EMERGENCY ROOM.  Lauren Rodriguez MD  4/4/2022 7:34:47 AM  This report has been verified and signed electronically.  Dear patient,  As a result of recent federal legislation (The Federal Cures Act), you may   receive lab or pathology results from your procedure in your [facility]   account before your physician is able to contact you. Your physician or   their representative will relay the results to you with their   recommendations at their soonest availability.  Thank you,  PROVATION

## 2022-04-04 NOTE — ANESTHESIA POSTPROCEDURE EVALUATION
Anesthesia Post Evaluation    Patient: Fadi Higgins    Procedure(s) Performed: Procedure(s) (LRB):  COLONOSCOPY (N/A)    Final Anesthesia Type: general      Patient location during evaluation: PACU  Patient participation: Yes- Able to Participate  Level of consciousness: awake and alert and oriented  Post-procedure vital signs: reviewed and stable  Pain management: adequate  Airway patency: patent    PONV status at discharge: No PONV  Anesthetic complications: no      Cardiovascular status: hemodynamically stable  Respiratory status: unassisted, spontaneous ventilation and room air  Hydration status: euvolemic  Follow-up not needed.          Vitals Value Taken Time   /75 04/04/22 0810   Temp 36.3 °C (97.3 °F) 04/04/22 0740   Pulse 62 04/04/22 0810   Resp 16 04/04/22 0810   SpO2 98 % 04/04/22 0810         Event Time   Out of Recovery 08:28:50         Pain/Julianne Score: Julianne Score: 10 (4/4/2022  8:10 AM)

## 2022-04-04 NOTE — TRANSFER OF CARE
"Anesthesia Transfer of Care Note    Patient: Fadi Higgins    Procedure(s) Performed: Procedure(s) (LRB):  COLONOSCOPY (N/A)    Patient location: PACU    Anesthesia Type: general    Transport from OR: Transported from OR on room air with adequate spontaneous ventilation    Post pain: adequate analgesia    Post assessment: no apparent anesthetic complications and tolerated procedure well    Level of consciousness: lethargic    Nausea/Vomiting: no nausea/vomiting    Complications: none    Transfer of care protocol was followed      Last vitals:   Visit Vitals  /67 (BP Location: Left arm, Patient Position: Lying)   Pulse 64   Temp 36.3 °C (97.3 °F) (Temporal)   Resp 14   Ht 5' 7" (1.702 m)   Wt 70.3 kg (155 lb)   SpO2 100%   BMI 24.28 kg/m²     " How Severe Is Your Skin Lesion?: mild Has Your Skin Lesion Been Treated?: not been treated Is This A New Presentation, Or A Follow-Up?: Skin Lesion

## 2022-04-04 NOTE — ANESTHESIA PREPROCEDURE EVALUATION
04/04/2022  Fadi Higgins is a 81 y.o., male.      Pre-op Assessment    I have reviewed the Patient Summary Reports.    I have reviewed the NPO Status.   I have reviewed the Medications.     Review of Systems  Social:  Former Smoker    Hematology/Oncology:  Hematology Normal   Oncology Normal     EENT/Dental:EENT/Dental Normal   Cardiovascular:  Cardiovascular Normal     Pulmonary:  Pulmonary Normal    Renal/:  Renal/ Normal     Hepatic/GI:  Hepatic/GI Normal    Musculoskeletal:   Arthritis     Neurological:  Neurology Normal    Endocrine:  Endocrine Normal    Dermatological:  Skin Normal    Psych:  Psychiatric Normal           Physical Exam  General: Well nourished, Alert and Cooperative    Airway:  Mallampati: I   Mouth Opening: Normal  TM Distance: Normal  Tongue: Normal  Neck ROM: Normal ROM    Dental:  Dentures        Anesthesia Plan  Type of Anesthesia, risks & benefits discussed:    Anesthesia Type: Gen Natural Airway  Intra-op Monitoring Plan: Standard ASA Monitors  Induction:  IV  Informed Consent: Informed consent signed with the Patient and all parties understand the risks and agree with anesthesia plan.  All questions answered. Patient consented to blood products? Refused (Taoism objection)  Patient blood refusal form needs to be completed. Please review the form for refusal details in the  tab of Chart Review.  ASA Score: 2  Day of Surgery Review of History & Physical: H&P Update referred to the surgeon/provider.    Ready For Surgery From Anesthesia Perspective.     No current facility-administered medications on file prior to encounter.     Current Outpatient Medications on File Prior to Encounter   Medication Sig Dispense Refill    cholecalciferol, vitamin D3, 2,000 unit Cap Take 1 capsule by mouth every evening.       saw palmetto 160 MG capsule Take 160 mg by mouth  every evening.       diclofenac sodium (VOLTAREN) 1 % Gel Apply 2 g topically once daily. 200 g 5    tadalafiL (CIALIS) 20 MG Tab Take 1 tablet (20 mg total) by mouth as needed (for ED). Generic cialis ( Tadalafil) (Patient not taking: No sig reported) 30 tablet 3     Past Medical History:   Diagnosis Date    BPH (benign prostatic hyperplasia)     Chronic constipation     Hyperlipidemia     Osteoarthritis     Primary osteoarthritis of left hip 7/16/2018    Vitiligo        .

## 2022-04-11 LAB
FINAL PATHOLOGIC DIAGNOSIS: NORMAL
GROSS: NORMAL
Lab: NORMAL

## 2022-04-12 DIAGNOSIS — D12.6 TUBULAR ADENOMA OF COLON: Primary | ICD-10-CM

## 2022-04-12 DIAGNOSIS — Z86.010 PERSONAL HISTORY OF COLONIC POLYPS: ICD-10-CM

## 2022-04-13 ENCOUNTER — TELEPHONE (OUTPATIENT)
Dept: INTERNAL MEDICINE | Facility: CLINIC | Age: 82
End: 2022-04-13
Payer: MEDICARE

## 2022-04-13 NOTE — TELEPHONE ENCOUNTER
Spoke with pt, informed him that he needs to have repeat colonoscopy in 6 months because a polyp was found and removed, so the colorectal surgery team wishes to reexamine the area in 6 months. I advised pt to schedule a colonoscopy around October 2022, and that the order is in, pt provided with Colonoscopy schedulers contact number [phone]. Pt voiced understanding and expressed no questions at this time.

## 2022-04-13 NOTE — TELEPHONE ENCOUNTER
----- Message from Scott Joyner MD sent at 4/12/2022  5:55 PM CDT -----  Patient needs to have repeat colonoscopy in 6 months. A worrisome polyp was found and removed, so the colorectal surgery team wishes to reexamine the area in 6 months.     Colonoscopy - a colonoscopy has been ordered for you. In order to schedule this appointment, please call [phone].     Try to have patient schedule around October 2022.

## 2022-06-08 ENCOUNTER — IMMUNIZATION (OUTPATIENT)
Dept: INTERNAL MEDICINE | Facility: CLINIC | Age: 82
End: 2022-06-08
Payer: MEDICARE

## 2022-06-08 DIAGNOSIS — Z23 NEED FOR VACCINATION: Primary | ICD-10-CM

## 2022-06-08 PROCEDURE — 91300 COVID-19, MRNA, LNP-S, PF, 30 MCG/0.3 ML DOSE VACCINE: CPT | Mod: PBBFAC | Performed by: INTERNAL MEDICINE

## 2022-07-29 DIAGNOSIS — Z12.11 SPECIAL SCREENING FOR MALIGNANT NEOPLASMS, COLON: Primary | ICD-10-CM

## 2022-07-29 RX ORDER — POLYETHYLENE GLYCOL 3350, SODIUM SULFATE ANHYDROUS, SODIUM BICARBONATE, SODIUM CHLORIDE, POTASSIUM CHLORIDE 236; 22.74; 6.74; 5.86; 2.97 G/4L; G/4L; G/4L; G/4L; G/4L
4 POWDER, FOR SOLUTION ORAL ONCE
Qty: 4000 ML | Refills: 0 | Status: SHIPPED | OUTPATIENT
Start: 2022-07-29 | End: 2022-07-29

## 2022-08-23 DIAGNOSIS — E78.5 HYPERLIPIDEMIA, UNSPECIFIED HYPERLIPIDEMIA TYPE: ICD-10-CM

## 2022-08-23 RX ORDER — ATORVASTATIN CALCIUM 80 MG/1
TABLET, FILM COATED ORAL
Qty: 90 TABLET | Refills: 3 | OUTPATIENT
Start: 2022-08-23

## 2022-08-23 NOTE — TELEPHONE ENCOUNTER
Quick DC. Inappropriate Request    Refill Authorization Note   Fadi Higgins  is requesting a refill authorization.  Brief Assessment and Rationale for Refill:  Quick Discontinue  Medication Therapy Plan:  Patient is no longer on this medication. Currently on Crestor    Medication Reconciliation Completed:  No      Comments:     Note composed:1:42 PM 08/23/2022

## 2022-08-23 NOTE — TELEPHONE ENCOUNTER
No new care gaps identified.  [facility] Embedded Care Gaps. Reference number: [id number]. 8/23/2022   11:13:16 AM CDT

## 2022-08-24 DIAGNOSIS — E78.5 HYPERLIPIDEMIA, UNSPECIFIED HYPERLIPIDEMIA TYPE: ICD-10-CM

## 2022-08-24 RX ORDER — ATORVASTATIN CALCIUM 80 MG/1
TABLET, FILM COATED ORAL
Qty: 90 TABLET | Refills: 3 | OUTPATIENT
Start: 2022-08-24

## 2022-08-24 NOTE — TELEPHONE ENCOUNTER
Rigoberto PATEL. Previously Denied   Refill Authorization Note   Fadi Higgins  is requesting a refill authorization.  Brief Assessment and Rationale for Refill:  Quick Discontinue  Medication Therapy Plan:  discontinued on 1/6/2022 by Scott Joyner MD for the following reason: [employer] Discontinuation    Medication Reconciliation Completed:  No      Comments:   Pended Medication(s)       Requested Prescriptions     Refused Prescriptions Disp Refills    atorvastatin (LIPITOR) 80 MG tablet [Pharmacy Med Name: ATORVASTATIN 80MG TABLETS] 90 tablet 3     Sig: TAKE 1 TABLET(80 MG) BY MOUTH EVERY EVENING     Refused By: YISEL HANNON     Reason for Refusal: Refill not appropriate        Duplicate Pended Encounter(s)/ Last Prescribed Details: (includes pharmacy & prescriber details)                 Note composed:11:33 AM 08/24/2022

## 2022-08-24 NOTE — TELEPHONE ENCOUNTER
No new care gaps identified.  [facility] Embedded Care Gaps. Reference number: [id number]. 8/24/2022   11:19:42 AM UZIELT

## 2022-08-29 ENCOUNTER — TELEPHONE (OUTPATIENT)
Dept: INTERNAL MEDICINE | Facility: CLINIC | Age: 82
End: 2022-08-29
Payer: MEDICARE

## 2022-08-29 NOTE — TELEPHONE ENCOUNTER
----- Message from Olya Chi sent at 8/29/2022 11:59 AM CDT -----  Contact: [phone]  Pt is calling he states he is needing atorvastatin filled this is not in his chart please give return call

## 2022-08-29 NOTE — TELEPHONE ENCOUNTER
Called pt    Pt has prescipt  for rosuvastatin; pt thought they needed to still take atorvastatin     Called pharmacy to check on rosuvastatin is still good     Called pt back to let them know that their rosuvastatin is still good to  from [facility].    Pt verbalized understanding and intents to pick it up.

## 2022-08-29 NOTE — TELEPHONE ENCOUNTER
----- Message from Tasia Finnegan sent at 8/29/2022  4:39 PM CDT -----  Contact: [phone]  Pt called to speak to Remy after a missed call. Please Advise     Please contact pt on the phone number listed. Please Advise

## 2022-10-06 ENCOUNTER — ANESTHESIA EVENT (OUTPATIENT)
Dept: ENDOSCOPY | Facility: HOSPITAL | Age: 82
End: 2022-10-06
Payer: MEDICARE

## 2022-10-06 ENCOUNTER — HOSPITAL ENCOUNTER (OUTPATIENT)
Facility: HOSPITAL | Age: 82
Discharge: HOME | End: 2022-10-06
Attending: COLON & RECTAL SURGERY | Admitting: COLON & RECTAL SURGERY
Payer: MEDICARE

## 2022-10-06 ENCOUNTER — ANESTHESIA (OUTPATIENT)
Dept: ENDOSCOPY | Facility: HOSPITAL | Age: 82
End: 2022-10-06
Payer: MEDICARE

## 2022-10-06 VITALS
RESPIRATION RATE: 16 BRPM | TEMPERATURE: 97 F | DIASTOLIC BLOOD PRESSURE: 76 MMHG | SYSTOLIC BLOOD PRESSURE: 131 MMHG | WEIGHT: 155 LBS | HEART RATE: 53 BPM | HEIGHT: 67 IN | BODY MASS INDEX: 24.33 KG/M2 | OXYGEN SATURATION: 100 %

## 2022-10-06 DIAGNOSIS — Z12.11 ENCOUNTER FOR SCREENING COLONOSCOPY: ICD-10-CM

## 2022-10-06 PROCEDURE — 88305 TISSUE EXAM BY PATHOLOGIST: ICD-10-PCS | Mod: 26,,, | Performed by: PATHOLOGY

## 2022-10-06 PROCEDURE — 25000003 PHARM REV CODE 250: Performed by: NURSE ANESTHETIST, CERTIFIED REGISTERED

## 2022-10-06 PROCEDURE — 45385 PR COLONOSCOPY,REMV LESN,SNARE: ICD-10-PCS | Mod: PT,,, | Performed by: COLON & RECTAL SURGERY

## 2022-10-06 PROCEDURE — 88305 TISSUE EXAM BY PATHOLOGIST: CPT | Mod: 26,,, | Performed by: PATHOLOGY

## 2022-10-06 PROCEDURE — 25000003 PHARM REV CODE 250: Performed by: COLON & RECTAL SURGERY

## 2022-10-06 PROCEDURE — E9220 PRA ENDO ANESTHESIA: ICD-10-PCS | Mod: PT,,, | Performed by: NURSE ANESTHETIST, CERTIFIED REGISTERED

## 2022-10-06 PROCEDURE — 88305 TISSUE EXAM BY PATHOLOGIST: CPT | Performed by: PATHOLOGY

## 2022-10-06 PROCEDURE — E9220 PRA ENDO ANESTHESIA: HCPCS | Mod: PT,,, | Performed by: NURSE ANESTHETIST, CERTIFIED REGISTERED

## 2022-10-06 PROCEDURE — 37000008 HC ANESTHESIA 1ST 15 MINUTES: Performed by: COLON & RECTAL SURGERY

## 2022-10-06 PROCEDURE — 63600175 PHARM REV CODE 636 W HCPCS: Performed by: NURSE ANESTHETIST, CERTIFIED REGISTERED

## 2022-10-06 PROCEDURE — 45385 COLONOSCOPY W/LESION REMOVAL: CPT | Mod: PT | Performed by: COLON & RECTAL SURGERY

## 2022-10-06 PROCEDURE — 45385 COLONOSCOPY W/LESION REMOVAL: CPT | Mod: PT,,, | Performed by: COLON & RECTAL SURGERY

## 2022-10-06 PROCEDURE — 27201089 HC SNARE, DISP (ANY): Performed by: COLON & RECTAL SURGERY

## 2022-10-06 PROCEDURE — 37000009 HC ANESTHESIA EA ADD 15 MINS: Performed by: COLON & RECTAL SURGERY

## 2022-10-06 RX ORDER — PROPOFOL 10 MG/ML
VIAL (ML) INTRAVENOUS CONTINUOUS PRN
Status: DISCONTINUED | OUTPATIENT
Start: 2022-10-06 | End: 2022-10-06

## 2022-10-06 RX ORDER — PHENYLEPHRINE HYDROCHLORIDE 10 MG/ML
INJECTION INTRAVENOUS
Status: DISCONTINUED | OUTPATIENT
Start: 2022-10-06 | End: 2022-10-06

## 2022-10-06 RX ORDER — PROPOFOL 10 MG/ML
VIAL (ML) INTRAVENOUS
Status: DISCONTINUED | OUTPATIENT
Start: 2022-10-06 | End: 2022-10-06

## 2022-10-06 RX ORDER — LIDOCAINE HYDROCHLORIDE 20 MG/ML
INJECTION INTRAVENOUS
Status: DISCONTINUED | OUTPATIENT
Start: 2022-10-06 | End: 2022-10-06

## 2022-10-06 RX ORDER — SODIUM CHLORIDE 9 MG/ML
INJECTION, SOLUTION INTRAVENOUS CONTINUOUS
Status: DISCONTINUED | OUTPATIENT
Start: 2022-10-06 | End: 2022-10-06 | Stop reason: HOSPADM

## 2022-10-06 RX ADMIN — LIDOCAINE HYDROCHLORIDE 40 MG: 20 INJECTION INTRAVENOUS at 07:10

## 2022-10-06 RX ADMIN — PROPOFOL 150 MCG/KG/MIN: 10 INJECTION, EMULSION INTRAVENOUS at 07:10

## 2022-10-06 RX ADMIN — PROPOFOL 70 MG: 10 INJECTION, EMULSION INTRAVENOUS at 07:10

## 2022-10-06 RX ADMIN — SODIUM CHLORIDE: 0.9 INJECTION, SOLUTION INTRAVENOUS at 06:10

## 2022-10-06 RX ADMIN — PHENYLEPHRINE HYDROCHLORIDE 100 MCG: 10 INJECTION INTRAVENOUS at 07:10

## 2022-10-06 NOTE — ANESTHESIA PREPROCEDURE EVALUATION
10/06/2022  Fadi Higgins Jr. is a 82 y.o., male.      Pre-op Assessment    I have reviewed the Patient Summary Reports.       I have reviewed the Medications.     Review of Systems  Anesthesia Hx:  No problems with previous Anesthesia Denies Hx of Anesthetic complications  Neg history of prior surgery. Denies Family Hx of Anesthesia complications.   Denies Personal Hx of Anesthesia complications.   Hematology/Oncology:  Hematology Normal   Oncology Normal     EENT/Dental:EENT/Dental Normal   Cardiovascular:   Exercise tolerance: good  Denies Angina.  Functional Capacity good / => 4 METS  Carotoid Artery Disease    Pulmonary:  Pulmonary Normal    Renal/:  Renal/ Normal     Hepatic/GI:   Denies GERD.    Musculoskeletal:   Arthritis     Neurological:  Denies Pain    Endocrine:  Endocrine Normal    Psych:  Psychiatric Normal   Phobia and Claustrophobia.         Physical Exam  General: Well nourished and Cooperative    Airway:  Mallampati: I   Mouth Opening: Normal  TM Distance: Normal  Tongue: Normal  Neck ROM: Normal ROM    Dental:  Intact    Chest/Lungs:  Clear to auscultation, Normal Respiratory Rate    Heart:  Rate: Normal        Anesthesia Plan  Type of Anesthesia, risks & benefits discussed:    Anesthesia Type: Gen Natural Airway  Intra-op Monitoring Plan: Standard ASA Monitors  Induction:  IV  Informed Consent: Informed consent signed with the Patient and all parties understand the risks and agree with anesthesia plan.  All questions answered.   ASA Score: 2  Day of Surgery Review of History & Physical: H&P Update referred to the surgeon/provider.    Ready For Surgery From Anesthesia Perspective.     .

## 2022-10-06 NOTE — PLAN OF CARE
Plan of care reviewed with patient, discharge criteria met and instructions given. Verbalized understanding.

## 2022-10-06 NOTE — H&P
"COLONOSCOPY HISTORY AND PHYSICAL EXAM    Procedure : Colonoscopy      INDICATIONS: personal history of colon polyps    Family Hx of CRC: denies    Last Colonoscopy:  2022  Findings: The perianal and digital rectal examinations were normal.        An 18 mm polyp was found in the cecum. The polyp was sessile. The        polyp was removed piecemeal with a Orise injection-lift technique        using a hot snare. Resection and retrieval were complete.   Sedation Problems: NO  Fam Hx of Sedation Problems: NO     Past Medical History:   Diagnosis Date    BPH (benign prostatic hyperplasia)     Chronic constipation     Hyperlipidemia     Osteoarthritis     Primary osteoarthritis of left hip 2018    Vitiligo      Family History   Problem Relation Age of Onset    Kidney disease Mother         diabetic    Diabetes Mother     Glaucoma Mother     Blindness Mother     Rheum arthritis Father     Macular degeneration Neg Hx     Retinal detachment Neg Hx      Social History     Socioeconomic History    Marital status:    Occupational History    Occupation: retired [employer]    Tobacco Use    Smoking status: Former     Packs/day: 1.00     Years: 5.00     Pack years: 5.00     Types: Cigarettes     Quit date: 1967     Years since quittin.0    Smokeless tobacco: Never   Substance and Sexual Activity    Alcohol use: No    Drug use: No    Sexual activity: Yes       Review of Systems - Negative except   Respiratory ROS: no dyspnea  Cardiovascular ROS: no exertional chest pain  Gastrointestinal ROS: NO abdominal discomfort,  NO rectal bleeding  Musculoskeletal ROS: no muscular pain  Neurological ROS: no recent stroke    Physical Exam:  BP (!) 146/76 (BP Location: Left arm, Patient Position: Lying)   Pulse 63   Temp 98.1 °F (36.7 °C) (Temporal)   Resp 16   Ht 5' 7" (1.702 m)   Wt 70.3 kg (155 lb)   SpO2 99%   BMI 24.28 kg/m²   General: no distress  Head: normocephalic  Mallampati Score   Neck: " supple, symmetrical, trachea midline  Lungs:  normal respiratory effort  Heart: regular rate and rhythm  Abdomen: soft, non-tender non-distented; bowel sounds normal; no masses,  no organomegaly  Extremities: no cyanosis or edema, or clubbing    ASA:  II    PLAN  COLONOSCOPY.    SedationPlan :MAC    The details of the procedure, the possible need for biopsy or polypectomy and the potential risks including bleeding, perforation, missed polyps were discussed in detail.    Susi Andrade MD [facility]  Colorectal Surgery

## 2022-10-06 NOTE — TRANSFER OF CARE
"Anesthesia Transfer of Care Note    Patient: Fadi Higgins Jr.    Procedure(s) Performed: Procedure(s) (LRB):  COLONOSCOPY (N/A)    Patient location: GI    Anesthesia Type: general    Transport from OR: Transported from OR on room air with adequate spontaneous ventilation    Post pain: adequate analgesia    Post assessment: no apparent anesthetic complications and tolerated procedure well    Post vital signs: stable    Level of consciousness: awake, alert and oriented    Nausea/Vomiting: no nausea/vomiting    Complications: none    Transfer of care protocol was followed      Last vitals:   Visit Vitals  /61   Pulse 60   Temp 36.7 °C (98.1 °F) (Temporal)   Resp 16   Ht 5' 7" (1.702 m)   Wt 70.3 kg (155 lb)   SpO2 96%   BMI 24.28 kg/m²     "

## 2022-10-06 NOTE — ANESTHESIA POSTPROCEDURE EVALUATION
Anesthesia Post Evaluation    Patient: Fadi Higgins Jr.    Procedure(s) Performed: Procedure(s) (LRB):  COLONOSCOPY (N/A)    Final Anesthesia Type: general      Patient location during evaluation: GI PACU  Patient participation: Yes- Able to Participate  Level of consciousness: awake and alert  Post-procedure vital signs: reviewed and stable  Pain management: adequate  Airway patency: patent    PONV status at discharge: No PONV  Anesthetic complications: no      Cardiovascular status: blood pressure returned to baseline  Respiratory status: unassisted  Hydration status: euvolemic  Follow-up not needed.          Vitals Value Taken Time   /76 10/06/22 0811   Temp 36.2 °C (97.2 °F) 10/06/22 0744   Pulse 53 10/06/22 0811   Resp 16 10/06/22 0811   SpO2 100 % 10/06/22 0811         Event Time   Out of Recovery 08:20:43         Pain/Julianne Score: Julianne Score: 9 (10/6/2022  7:58 AM)

## 2022-10-06 NOTE — PROVATION PATIENT INSTRUCTIONS
Discharge Summary/Instructions after an Endoscopic Procedure  Patient Name: Fadi Higgins  Patient MRN: [id number]  Patient YOB: 1940 Thursday, October 6, 2022  Lauren Rodriguez MD  Dear patient,  As a result of recent federal legislation (The Federal Cures Act), you may   receive lab or pathology results from your procedure in your [facility]   account before your physician is able to contact you. Your physician or   their representative will relay the results to you with their   recommendations at their soonest availability.  Thank you,  RESTRICTIONS:  During your procedure today, you received medications for sedation.  These   medications may affect your judgment, balance and coordination.  Therefore,   for 24 hours, you have the following restrictions:   - DO NOT drive a car, operate machinery, make legal/financial decisions,   sign important papers or drink alcohol.    ACTIVITY:  Today: no heavy lifting, straining or running due to procedural   sedation/anesthesia.  The following day: return to full activity including work.  DIET:  Eat and drink normally unless instructed otherwise.     TREATMENT FOR COMMON SIDE EFFECTS:  - Mild abdominal pain, nausea, belching, bloating or excessive gas:  rest,   eat lightly and use a heating pad.  - Sore Throat: treat with throat lozenges and/or gargle with warm salt   water.  - Because air was used during the procedure, expelling large amounts of air   from your rectum or belching is normal.  - If a bowel prep was taken, you may not have a bowel movement for 1-3 days.    This is normal.  SYMPTOMS TO WATCH FOR AND REPORT TO YOUR PHYSICIAN:  1. Abdominal pain or bloating, other than gas cramps.  2. Chest pain.  3. Back pain.  4. Signs of infection such as: chills or fever occurring within 24 hours   after the procedure.  5. Rectal bleeding, which would show as bright red, maroon, or black stools.   (A tablespoon of blood from the rectum is not serious,  especially if   hemorrhoids are present.)  6. Vomiting.  7. Weakness or dizziness.  GO DIRECTLY TO THE NEAREST EMERGENCY ROOM IF YOU HAVE ANY OF THE FOLLOWING:      Difficulty breathing              Chills and/or fever over 101 F   Persistent vomiting and/or vomiting blood   Severe abdominal pain   Severe chest pain   Black, tarry stools   Bleeding- more than one tablespoon   Any other symptom or condition that you feel may need urgent attention  Your doctor recommends these additional instructions:  If any biopsies were taken, your doctors clinic will contact you in 1 to 2   weeks with any results.  - Discharge patient to home.   - Resume previous diet.   - Continue present medications.   - Await pathology results.   - Repeat colonoscopy in 3 years for surveillance.   - Return to primary care physician.   - Written discharge instructions were provided to the patient.   - The signs and symptoms of potential delayed complications were discussed   with the patient.   - Patient has a contact number available for emergencies.   - Return to normal activities tomorrow.  For questions, problems or results please call your physician - Lauren Rodriguez MD at Work:  [phone].  [facility], EMERGENCY ROOM PHONE NUMBER: [phone]  IF A COMPLICATION OR EMERGENCY SITUATION ARISES AND YOU ARE UNABLE TO REACH   YOUR PHYSICIAN - GO DIRECTLY TO THE EMERGENCY ROOM.  Lauren Rodriguez MD  10/6/2022 7:37:39 AM  This report has been verified and signed electronically.  Dear patient,  As a result of recent federal legislation (The Federal Cures Act), you may   receive lab or pathology results from your procedure in your [facility]   account before your physician is able to contact you. Your physician or   their representative will relay the results to you with their   recommendations at their soonest availability.  Thank you,  PROVATION

## 2022-10-10 LAB
FINAL PATHOLOGIC DIAGNOSIS: NORMAL
GROSS: NORMAL
Lab: NORMAL

## 2023-02-08 NOTE — PROGRESS NOTES
"INTERNAL MEDICINE CLINIC - SAME DAY APPOINTMENT  Progress Note    PRESENTING HISTORY     PCP: Scott Joyner MD    Chief Complaint/Reason for Visit:       History of Present Illness & ROS : Mr. Fadi Higgins Jr. is a 82 y.o. male.    Same day appt.   New to me.   Very pleasant gentleman.   Reports that 'noticed smell to urine with some urgency and onset of weakness on yesterday'. No fever, chills nausea or back pain. States, "I don't drink enough water".   In addition, reports that he has been having chronic pain to both 'shoulders for years', but 'started taking some TV commercialized Omega tabs, and don't know if related, but, joints and muscles are hurting all over'. He has since stopped the Burns.   Reports that having 'chronic pain to both shoulders, hurts to lift arms'. Not taking anything for relief.   No chest pain or SOB.     Review of Systems:  Eyes: denies visual changes at this time denies floaters   ENT: no nasal congestion or sore throat  Respiratory: no cough or shortness of breath  Cardiovascular: no chest pain or palpitations  Gastrointestinal: no nausea or vomiting, no abdominal pain or change in bowel habits  Hematologic/Lymphatic: no easy bruising or lymphadenopathy  Neurological: no seizures or tremors  Endocrine: no heat or cold intolerance      PAST HISTORY:     Past Medical History:   Diagnosis Date    BPH (benign prostatic hyperplasia)     Chronic constipation     Hyperlipidemia     Osteoarthritis     Primary osteoarthritis of left hip 7/16/2018    Vitiligo        Past Surgical History:   Procedure Laterality Date    CATARACT EXTRACTION W/  INTRAOCULAR LENS IMPLANT  2011    OU w/ dr. bryant    COLONOSCOPY N/A 4/4/2022    Procedure: COLONOSCOPY;  Surgeon: Lauren Rodriguez MD;  Location: [address];  Service: Endoscopy;  Laterality: N/A;  covid test 4/1/22 sarah, prep instr mailed -ml    COLONOSCOPY N/A 10/6/2022    Procedure: COLONOSCOPY;  Surgeon: Lauren Rodriguez MD;  " [facility]  Service: Endoscopy;  Laterality: N/A;   fully vaccinated; instructions mailed-    EYE SURGERY      HIP ARTHROPLASTY Left 2019    Procedure: ARTHROPLASTY, HIP;  Surgeon: Miki Navarro MD;  Location: [facility];  Service: Orthopedics;  Laterality: Left;    JOINT REPLACEMENT Left 2019    LUIS       Family History   Problem Relation Age of Onset    Kidney disease Mother         diabetic    Diabetes Mother     Glaucoma Mother     Blindness Mother     Rheum arthritis Father     Macular degeneration Neg Hx     Retinal detachment Neg Hx        Social History     Socioeconomic History    Marital status:    Occupational History    Occupation: retired [employer]    Tobacco Use    Smoking status: Former     Packs/day: 1.00     Years: 5.00     Pack years: 5.00     Types: Cigarettes     Quit date: 1967     Years since quittin.4    Smokeless tobacco: Never   Substance and Sexual Activity    Alcohol use: No    Drug use: No    Sexual activity: Yes       MEDICATIONS & ALLERGIES:     Current Outpatient Medications on File Prior to Visit   Medication Sig Dispense Refill    cholecalciferol, vitamin D3, 2,000 unit Cap Take 1 capsule by mouth every evening.       diclofenac sodium (VOLTAREN) 1 % Gel Apply 2 g topically once daily. 200 g 5    rosuvastatin (CRESTOR) 20 MG tablet Take 1 tablet (20 mg total) by mouth every evening. 90 tablet 3    saw palmetto 160 MG capsule Take 160 mg by mouth every evening.       tadalafiL (CIALIS) 20 MG Tab Take 1 tablet (20 mg total) by mouth as needed (for ED). Generic cialis ( Tadalafil) (Patient not taking: No sig reported) 30 tablet 3     No current facility-administered medications on file prior to visit.        Review of patient's allergies indicates:  No Known Allergies    Medications Reconciliation:   I have reconciled the patient's home medications with the patient/family. I have updated all changes.   Refer to After-Visit Medication List.    OBJECTIVE:     Vital Signs:  There were no vitals filed for this visit.  Wt Readings from Last 3 Encounters:   10/06/22 0636 70.3 kg (155 lb)   04/04/22 0637 70.3 kg (155 lb)   12/28/21 1511 72.6 kg (160 lb 0.9 oz)     There is no height or weight on file to calculate BMI.   Wt Readings from Last 3 Encounters:   02/09/23 73.6 kg (162 lb 4.1 oz)   10/06/22 70.3 kg (155 lb)   04/04/22 70.3 kg (155 lb)     Temp Readings from Last 3 Encounters:   10/06/22 97.2 °F (36.2 °C) (Temporal)   04/04/22 97.3 °F (36.3 °C) (Temporal)   10/23/21 98.2 °F (36.8 °C)     BP Readings from Last 3 Encounters:   02/09/23 118/62   10/06/22 131/76   04/04/22 (!) 143/75     Pulse Readings from Last 3 Encounters:   02/09/23 66   10/06/22 (!) 53   04/04/22 62       Physical Exam:  (Focused Exam)  General: Well developed, well nourished. No distress.  HEENT: Head is normocephalic, atraumatic  Eyes: Clear conjunctiva.  Neck: Supple, symmetrical neck; trachea midline.  Cardiovascular: Heart with regular rate and rhythm. No murmurs, gallops or rubs  Extremities: Pulses 2+ and symmetric.   Abdomen: Abdomen is soft, non-tender non-distended with normal bowel sounds.  Skin: Skin color, texture, turgor normal. No rashes.  Musculoskeletal: Normal gait.   Neurologic:  No focal numbness or weakness.     Laboratory  Lab Results   Component Value Date    WBC 4.69 01/06/2022    HGB 12.7 (L) 01/06/2022    HCT 40.9 01/06/2022     01/06/2022    CHOL 224 (H) 01/06/2022    TRIG 100 01/06/2022    HDL 48 01/06/2022    ALT 15 01/06/2022    AST 19 01/06/2022     01/06/2022    K 4.6 01/06/2022     01/06/2022    CREATININE 0.9 01/06/2022    BUN 13 01/06/2022    CO2 24 01/06/2022    TSH 1.681 01/06/2022    PSA 1.8 01/06/2022    INR 1.1 05/09/2019    HGBA1C 5.5 01/06/2022       ASSESSMENT & PLAN:     Same day appt.     Foul smelling urine, acute onset x 24 hours /   Urinary tract infection without hematuria,  site unspecified /   *UTI in male, last documented with [facility] in 10/2021, E Coli  -     POCT URINE DIPSTICK WITHOUT MICROSCOPE (+ Leukos, RBCs, Cloudy)  -     Urinalysis; Future; Expected date: 02/09/2023  -     Urine culture; Future; Expected date: 02/09/2023  -     CBC Auto Differential; Future; Expected date: 02/09/2023  -     Comprehensive Metabolic Panel; Future; Expected date: 02/09/2023  -     ciprofloxacin HCl (CIPRO) 500 MG tablet; Take 1 tablet (500 mg total) by mouth every 12 (twelve) hours.  Dispense: 14 tablet; Refill: 0    Weakness/ Myalgia:  ? Related to Urine issue  ? Related to statin  ? Related vol depletion   -     Urinalysis; Future; Expected date: 02/09/2023  -     Urine culture; Future; Expected date: 02/09/2023  -     CBC Auto Differential; Future; Expected date: 02/09/2023  -     Comprehensive Metabolic Panel; Future; Expected date: 02/09/2023  -     CK; Future; Expected date: 02/09/2023    Pain of both shoulder joints  -     X-Ray Shoulder 2 or More views Bilateral; Future; Expected date: 02/09/2023      Future Appointments   Date Time Provider Department Center   2/9/2023 10:15 AM [facility] [id number] LB LIMIT [facility] XRAY [facility]   2/9/2023 10:45 AM LAB, SAME DAY [facility] LAB [facility]   2/9/2023 11:15 AM LAB, SAME DAY [facility] LAB [facility]   4/28/2023  3:30 PM Scott Joyner MD [facility]        Medication List            Accurate as of February 9, 2023 10:01 AM. If you have any questions, ask your nurse or doctor.                START taking these medications      ciprofloxacin HCl 500 MG tablet  Commonly known as: CIPRO  Take 1 tablet (500 mg total) by mouth every 12 (twelve) hours.  Started by: ZARA Acosta            CONTINUE taking these medications      cholecalciferol (vitamin D3) 50 mcg (2,000 unit) Cap capsule  Commonly known as: VITAMIN D3     diclofenac sodium 1 % Gel  Commonly known as: VOLTAREN  Apply 2 g topically once daily.      rosuvastatin 20 MG tablet  Commonly known as: CRESTOR  Take 1 tablet (20 mg total) by mouth every evening.     saw palmetto 160 MG capsule     tadalafiL 20 MG Tab  Commonly known as: CIALIS  Take 1 tablet (20 mg total) by mouth as needed (for ED). Generic cialis ( Tadalafil)               Where to Get Your Medications        These medications were sent to [id number] - [place], [place] - [address] AT [facility] [address] [place] [address]      Phone: [phone]   ciprofloxacin HCl 500 MG tablet       Signing Physician:  ZARA Acosta

## 2023-02-09 ENCOUNTER — TELEPHONE (OUTPATIENT)
Dept: INTERNAL MEDICINE | Facility: CLINIC | Age: 83
End: 2023-02-09

## 2023-02-09 ENCOUNTER — OFFICE VISIT (OUTPATIENT)
Dept: INTERNAL MEDICINE | Facility: CLINIC | Age: 83
End: 2023-02-09
Payer: MEDICARE

## 2023-02-09 ENCOUNTER — HOSPITAL ENCOUNTER (OUTPATIENT)
Dept: RADIOLOGY | Facility: HOSPITAL | Age: 83
Discharge: HOME | End: 2023-02-09
Attending: NURSE PRACTITIONER
Payer: MEDICARE

## 2023-02-09 VITALS
OXYGEN SATURATION: 99 % | BODY MASS INDEX: 25.47 KG/M2 | SYSTOLIC BLOOD PRESSURE: 118 MMHG | DIASTOLIC BLOOD PRESSURE: 62 MMHG | WEIGHT: 162.25 LBS | HEART RATE: 66 BPM | HEIGHT: 67 IN

## 2023-02-09 DIAGNOSIS — N39.0 URINARY TRACT INFECTION WITHOUT HEMATURIA, SITE UNSPECIFIED: ICD-10-CM

## 2023-02-09 DIAGNOSIS — R82.90 FOUL SMELLING URINE: Primary | ICD-10-CM

## 2023-02-09 DIAGNOSIS — M25.511 BILATERAL SHOULDER PAIN, UNSPECIFIED CHRONICITY: Primary | ICD-10-CM

## 2023-02-09 DIAGNOSIS — R53.1 WEAKNESS: ICD-10-CM

## 2023-02-09 DIAGNOSIS — M79.10 MYALGIA: ICD-10-CM

## 2023-02-09 DIAGNOSIS — M25.511 PAIN OF BOTH SHOULDER JOINTS: ICD-10-CM

## 2023-02-09 DIAGNOSIS — R93.89 ABNORMAL X-RAY: ICD-10-CM

## 2023-02-09 DIAGNOSIS — M25.512 PAIN OF BOTH SHOULDER JOINTS: ICD-10-CM

## 2023-02-09 DIAGNOSIS — M25.512 BILATERAL SHOULDER PAIN, UNSPECIFIED CHRONICITY: Primary | ICD-10-CM

## 2023-02-09 DIAGNOSIS — R82.90 FOUL SMELLING URINE: ICD-10-CM

## 2023-02-09 LAB
BILIRUB SERPL-MCNC: NEGATIVE MG/DL
BLOOD URINE, POC: NORMAL
CLARITY, POC UA: NORMAL
COLOR, POC UA: YELLOW
GLUCOSE UR QL STRIP: NORMAL
KETONES UR QL STRIP: NEGATIVE
LEUKOCYTE ESTERASE URINE, POC: NORMAL
NITRITE, POC UA: NEGATIVE
PH, POC UA: 5
PROTEIN, POC: NORMAL
SPECIFIC GRAVITY, POC UA: 1.02
UROBILINOGEN, POC UA: NORMAL

## 2023-02-09 PROCEDURE — 99214 OFFICE O/P EST MOD 30 MIN: CPT | Mod: S$GLB,,, | Performed by: NURSE PRACTITIONER

## 2023-02-09 PROCEDURE — 73030 X-RAY EXAM OF SHOULDER: CPT | Mod: TC,50

## 2023-02-09 PROCEDURE — 1125F PR PAIN SEVERITY QUANTIFIED, PAIN PRESENT: ICD-10-PCS | Mod: CPTII,S$GLB,, | Performed by: NURSE PRACTITIONER

## 2023-02-09 PROCEDURE — 99214 PR OFFICE/OUTPT VISIT, EST, LEVL IV, 30-39 MIN: ICD-10-PCS | Mod: S$GLB,,, | Performed by: NURSE PRACTITIONER

## 2023-02-09 PROCEDURE — 1101F PT FALLS ASSESS-DOCD LE1/YR: CPT | Mod: CPTII,S$GLB,, | Performed by: NURSE PRACTITIONER

## 2023-02-09 PROCEDURE — 1159F PR MEDICATION LIST DOCUMENTED IN MEDICAL RECORD: ICD-10-PCS | Mod: CPTII,S$GLB,, | Performed by: NURSE PRACTITIONER

## 2023-02-09 PROCEDURE — 1125F AMNT PAIN NOTED PAIN PRSNT: CPT | Mod: CPTII,S$GLB,, | Performed by: NURSE PRACTITIONER

## 2023-02-09 PROCEDURE — 81002 URINALYSIS NONAUTO W/O SCOPE: CPT | Mod: S$GLB,,, | Performed by: NURSE PRACTITIONER

## 2023-02-09 PROCEDURE — 81002 POCT URINE DIPSTICK WITHOUT MICROSCOPE: ICD-10-PCS | Mod: S$GLB,,, | Performed by: NURSE PRACTITIONER

## 2023-02-09 PROCEDURE — 3078F DIAST BP <80 MM HG: CPT | Mod: CPTII,S$GLB,, | Performed by: NURSE PRACTITIONER

## 2023-02-09 PROCEDURE — 3074F PR MOST RECENT SYSTOLIC BLOOD PRESSURE < 130 MM HG: ICD-10-PCS | Mod: CPTII,S$GLB,, | Performed by: NURSE PRACTITIONER

## 2023-02-09 PROCEDURE — 1101F PR PT FALLS ASSESS DOC 0-1 FALLS W/OUT INJ PAST YR: ICD-10-PCS | Mod: CPTII,S$GLB,, | Performed by: NURSE PRACTITIONER

## 2023-02-09 PROCEDURE — 73030 X-RAY EXAM OF SHOULDER: CPT | Mod: 26,50,, | Performed by: RADIOLOGY

## 2023-02-09 PROCEDURE — 3078F PR MOST RECENT DIASTOLIC BLOOD PRESSURE < 80 MM HG: ICD-10-PCS | Mod: CPTII,S$GLB,, | Performed by: NURSE PRACTITIONER

## 2023-02-09 PROCEDURE — 1160F RVW MEDS BY RX/DR IN RCRD: CPT | Mod: CPTII,S$GLB,, | Performed by: NURSE PRACTITIONER

## 2023-02-09 PROCEDURE — 3288F PR FALLS RISK ASSESSMENT DOCUMENTED: ICD-10-PCS | Mod: CPTII,S$GLB,, | Performed by: NURSE PRACTITIONER

## 2023-02-09 PROCEDURE — 99999 PR PBB SHADOW E&M-EST. PATIENT-LVL III: CPT | Mod: PBBFAC,,, | Performed by: NURSE PRACTITIONER

## 2023-02-09 PROCEDURE — 1160F PR REVIEW ALL MEDS BY PRESCRIBER/CLIN PHARMACIST DOCUMENTED: ICD-10-PCS | Mod: CPTII,S$GLB,, | Performed by: NURSE PRACTITIONER

## 2023-02-09 PROCEDURE — 3074F SYST BP LT 130 MM HG: CPT | Mod: CPTII,S$GLB,, | Performed by: NURSE PRACTITIONER

## 2023-02-09 PROCEDURE — 1159F MED LIST DOCD IN RCRD: CPT | Mod: CPTII,S$GLB,, | Performed by: NURSE PRACTITIONER

## 2023-02-09 PROCEDURE — 3288F FALL RISK ASSESSMENT DOCD: CPT | Mod: CPTII,S$GLB,, | Performed by: NURSE PRACTITIONER

## 2023-02-09 PROCEDURE — 73030 XR SHOULDER COMPLETE 2 OR MORE VIEWS BILATERAL: ICD-10-PCS | Mod: 26,50,, | Performed by: RADIOLOGY

## 2023-02-09 PROCEDURE — 99999 PR PBB SHADOW E&M-EST. PATIENT-LVL III: ICD-10-PCS | Mod: PBBFAC,,, | Performed by: NURSE PRACTITIONER

## 2023-02-09 RX ORDER — CIPROFLOXACIN 500 MG/1
500 TABLET ORAL EVERY 12 HOURS
Qty: 14 TABLET | Refills: 0 | Status: SHIPPED | OUTPATIENT
Start: 2023-02-09 | End: 2023-05-19 | Stop reason: ALTCHOICE

## 2023-02-09 NOTE — TELEPHONE ENCOUNTER
----- Message from ZARA Arias sent at 2/9/2023  1:07 PM CST -----  Please let Mr Fadi know that he has some moderate arthritic changes to his shoulders, right and left, and recommend getting in with an Orthopedic shoulder expert and have placed referral to schedule.     TY

## 2023-02-09 NOTE — TELEPHONE ENCOUNTER
----- Message from ZARA Arias sent at 2/9/2023  3:08 PM CST -----  Please let him know that:   Suspect his 'weakness' is related to the urine infection; if his symptoms persist upon completion of the antibiotic,  please reach out. Otherwise, labs are non revealing any overly concerning cause.     TY

## 2023-02-10 ENCOUNTER — TELEPHONE (OUTPATIENT)
Dept: INTERNAL MEDICINE | Facility: CLINIC | Age: 83
End: 2023-02-10
Payer: MEDICARE

## 2023-02-13 ENCOUNTER — TELEPHONE (OUTPATIENT)
Dept: SPORTS MEDICINE | Facility: CLINIC | Age: 83
End: 2023-02-13
Payer: MEDICARE

## 2023-02-13 NOTE — TELEPHONE ENCOUNTER
Called the patient regarding appointment with Rosalba Layton PA-C on 2/15. While on the call the patient requested to cancel his appointment on 2/15. He stated he prefer to try home exercises first. He also stated her does not want injections or medication. He's going to try shoulder exercises at home an if he doesn't get any relief from that he'll schedule an appointment.

## 2023-03-12 DIAGNOSIS — E78.5 HYPERLIPIDEMIA, UNSPECIFIED HYPERLIPIDEMIA TYPE: ICD-10-CM

## 2023-03-12 NOTE — TELEPHONE ENCOUNTER
Care Due:                  Date            Visit Type   Department     Provider  --------------------------------------------------------------------------------                                EP -                              PRIMARY      [facility] INTERNAL  Last Visit: 12-      CARE ([facility])   KIRSTEN Joyner                              EP -                              PRIMARY      [facility] INTERNAL  Next Visit: 04-      CARE ([facility])   KIRSTEN Joyner                                                            Last  Test          Frequency    Reason                     Performed    Due Date  --------------------------------------------------------------------------------    Lipid Panel.  12 months..  rosuvastatin.............  01- 01-    [facility] Embedded Care Gaps. Reference number: [id number]. 3/12/2023   4:00:00 PM CDT

## 2023-03-12 NOTE — TELEPHONE ENCOUNTER
Refill Routing Note   Medication(s) are not appropriate for processing by [facility] for the following reason(s):       Required labs outdated    [facility] action(s):  Defer   Requires labs : Yes         Appointments  past 12m or future 3m with PCP    Date Provider   Last Visit   12/28/2021 Scott Joyner MD   Next Visit   4/28/2023 Scott Joyner MD   ED visits in past 90 days: 0        Note composed:4:01 PM 03/12/2023

## 2023-03-13 RX ORDER — ROSUVASTATIN CALCIUM 20 MG/1
TABLET, COATED ORAL
Qty: 90 TABLET | Refills: 1 | Status: SHIPPED | OUTPATIENT
Start: 2023-03-13

## 2023-05-19 ENCOUNTER — LAB VISIT (OUTPATIENT)
Dept: LAB | Facility: HOSPITAL | Age: 83
End: 2023-05-19
Attending: INTERNAL MEDICINE
Payer: MEDICARE

## 2023-05-19 ENCOUNTER — OFFICE VISIT (OUTPATIENT)
Dept: INTERNAL MEDICINE | Facility: CLINIC | Age: 83
End: 2023-05-19
Payer: MEDICARE

## 2023-05-19 ENCOUNTER — TELEPHONE (OUTPATIENT)
Dept: INTERNAL MEDICINE | Facility: CLINIC | Age: 83
End: 2023-05-19
Payer: MEDICARE

## 2023-05-19 VITALS
HEIGHT: 67 IN | BODY MASS INDEX: 25.22 KG/M2 | SYSTOLIC BLOOD PRESSURE: 112 MMHG | HEART RATE: 62 BPM | WEIGHT: 160.69 LBS | OXYGEN SATURATION: 96 % | DIASTOLIC BLOOD PRESSURE: 66 MMHG

## 2023-05-19 DIAGNOSIS — Z23 NEED FOR TETANUS, DIPHTHERIA, AND ACELLULAR PERTUSSIS (TDAP) VACCINE: ICD-10-CM

## 2023-05-19 DIAGNOSIS — Z00.00 ENCOUNTER FOR ANNUAL PHYSICAL EXAM: Primary | ICD-10-CM

## 2023-05-19 DIAGNOSIS — R03.0 ELEVATED BLOOD PRESSURE READING: ICD-10-CM

## 2023-05-19 DIAGNOSIS — M25.552 LEFT HIP PAIN: ICD-10-CM

## 2023-05-19 DIAGNOSIS — Z86.010 PERSONAL HISTORY OF COLONIC POLYPS: ICD-10-CM

## 2023-05-19 DIAGNOSIS — R73.09 ELEVATED RANDOM BLOOD GLUCOSE LEVEL: ICD-10-CM

## 2023-05-19 DIAGNOSIS — Z23 NEED FOR SHINGLES VACCINE: ICD-10-CM

## 2023-05-19 DIAGNOSIS — Z12.5 PROSTATE CANCER SCREENING: ICD-10-CM

## 2023-05-19 DIAGNOSIS — Z96.642 HISTORY OF LEFT HIP REPLACEMENT: ICD-10-CM

## 2023-05-19 DIAGNOSIS — E78.2 MIXED HYPERLIPIDEMIA: ICD-10-CM

## 2023-05-19 DIAGNOSIS — H91.93 BILATERAL HEARING LOSS, UNSPECIFIED HEARING LOSS TYPE: ICD-10-CM

## 2023-05-19 DIAGNOSIS — I70.0 ABDOMINAL AORTIC ATHEROSCLEROSIS: ICD-10-CM

## 2023-05-19 DIAGNOSIS — R35.1 NOCTURIA: ICD-10-CM

## 2023-05-19 DIAGNOSIS — Z87.440 HISTORY OF UTI: ICD-10-CM

## 2023-05-19 LAB
ALBUMIN SERPL BCP-MCNC: 3.7 G/DL (ref 3.5–5.2)
ALP SERPL-CCNC: 94 U/L (ref 55–135)
ALT SERPL W/O P-5'-P-CCNC: 14 U/L (ref 10–44)
ANION GAP SERPL CALC-SCNC: 9 MMOL/L (ref 8–16)
AST SERPL-CCNC: 21 U/L (ref 10–40)
BASOPHILS # BLD AUTO: 0.04 K/UL (ref 0–0.2)
BASOPHILS NFR BLD: 0.8 % (ref 0–1.9)
BILIRUB SERPL-MCNC: 0.4 MG/DL (ref 0.1–1)
BILIRUB UR QL STRIP: NEGATIVE
BUN SERPL-MCNC: 13 MG/DL (ref 8–23)
CALCIUM SERPL-MCNC: 9.3 MG/DL (ref 8.7–10.5)
CHLORIDE SERPL-SCNC: 108 MMOL/L (ref 95–110)
CHOLEST SERPL-MCNC: 208 MG/DL (ref 120–199)
CHOLEST/HDLC SERPL: 4.5 {RATIO} (ref 2–5)
CLARITY UR REFRACT.AUTO: CLEAR
CO2 SERPL-SCNC: 26 MMOL/L (ref 23–29)
COLOR UR AUTO: YELLOW
COMPLEXED PSA SERPL-MCNC: 2 NG/ML (ref 0–4)
CREAT SERPL-MCNC: 0.9 MG/DL (ref 0.5–1.4)
DIFFERENTIAL METHOD: ABNORMAL
EOSINOPHIL # BLD AUTO: 0.1 K/UL (ref 0–0.5)
EOSINOPHIL NFR BLD: 1.7 % (ref 0–8)
ERYTHROCYTE [DISTWIDTH] IN BLOOD BY AUTOMATED COUNT: 13.7 % (ref 11.5–14.5)
EST. GFR  (NO RACE VARIABLE): >60 ML/MIN/1.73 M^2
ESTIMATED AVG GLUCOSE: 111 MG/DL (ref 68–131)
GLUCOSE SERPL-MCNC: 83 MG/DL (ref 70–110)
GLUCOSE UR QL STRIP: NEGATIVE
HBA1C MFR BLD: 5.5 % (ref 4–5.6)
HCT VFR BLD AUTO: 40.5 % (ref 40–54)
HDLC SERPL-MCNC: 46 MG/DL (ref 40–75)
HDLC SERPL: 22.1 % (ref 20–50)
HGB BLD-MCNC: 12.5 G/DL (ref 14–18)
HGB UR QL STRIP: NEGATIVE
IMM GRANULOCYTES # BLD AUTO: 0.01 K/UL (ref 0–0.04)
IMM GRANULOCYTES NFR BLD AUTO: 0.2 % (ref 0–0.5)
KETONES UR QL STRIP: NEGATIVE
LDLC SERPL CALC-MCNC: 149.4 MG/DL (ref 63–159)
LEUKOCYTE ESTERASE UR QL STRIP: ABNORMAL
LYMPHOCYTES # BLD AUTO: 1.3 K/UL (ref 1–4.8)
LYMPHOCYTES NFR BLD: 27.3 % (ref 18–48)
MCH RBC QN AUTO: 26.4 PG (ref 27–31)
MCHC RBC AUTO-ENTMCNC: 30.9 G/DL (ref 32–36)
MCV RBC AUTO: 86 FL (ref 82–98)
MICROSCOPIC COMMENT: NORMAL
MONOCYTES # BLD AUTO: 0.5 K/UL (ref 0.3–1)
MONOCYTES NFR BLD: 9.9 % (ref 4–15)
NEUTROPHILS # BLD AUTO: 2.9 K/UL (ref 1.8–7.7)
NEUTROPHILS NFR BLD: 60.1 % (ref 38–73)
NITRITE UR QL STRIP: NEGATIVE
NONHDLC SERPL-MCNC: 162 MG/DL
NRBC BLD-RTO: 0 /100 WBC
PH UR STRIP: 6 [PH] (ref 5–8)
PLATELET # BLD AUTO: 274 K/UL (ref 150–450)
PMV BLD AUTO: 10.5 FL (ref 9.2–12.9)
POTASSIUM SERPL-SCNC: 4.2 MMOL/L (ref 3.5–5.1)
PROT SERPL-MCNC: 7.1 G/DL (ref 6–8.4)
PROT UR QL STRIP: NEGATIVE
RBC # BLD AUTO: 4.73 M/UL (ref 4.6–6.2)
RBC #/AREA URNS AUTO: 1 /HPF (ref 0–4)
SODIUM SERPL-SCNC: 143 MMOL/L (ref 136–145)
SP GR UR STRIP: 1.01 (ref 1–1.03)
SQUAMOUS #/AREA URNS AUTO: 0 /HPF
TRIGL SERPL-MCNC: 63 MG/DL (ref 30–150)
TSH SERPL DL<=0.005 MIU/L-ACNC: 1.2 UIU/ML (ref 0.4–4)
URN SPEC COLLECT METH UR: ABNORMAL
WBC # BLD AUTO: 4.77 K/UL (ref 3.9–12.7)
WBC #/AREA URNS AUTO: 5 /HPF (ref 0–5)

## 2023-05-19 PROCEDURE — 81001 URINALYSIS AUTO W/SCOPE: CPT | Performed by: INTERNAL MEDICINE

## 2023-05-19 PROCEDURE — 1126F AMNT PAIN NOTED NONE PRSNT: CPT | Mod: CPTII,S$GLB,, | Performed by: INTERNAL MEDICINE

## 2023-05-19 PROCEDURE — 3288F FALL RISK ASSESSMENT DOCD: CPT | Mod: CPTII,S$GLB,, | Performed by: INTERNAL MEDICINE

## 2023-05-19 PROCEDURE — 99397 PER PM REEVAL EST PAT 65+ YR: CPT | Mod: S$GLB,,, | Performed by: INTERNAL MEDICINE

## 2023-05-19 PROCEDURE — 36415 COLL VENOUS BLD VENIPUNCTURE: CPT | Performed by: INTERNAL MEDICINE

## 2023-05-19 PROCEDURE — 1126F PR PAIN SEVERITY QUANTIFIED, NO PAIN PRESENT: ICD-10-PCS | Mod: CPTII,S$GLB,, | Performed by: INTERNAL MEDICINE

## 2023-05-19 PROCEDURE — 1101F PR PT FALLS ASSESS DOC 0-1 FALLS W/OUT INJ PAST YR: ICD-10-PCS | Mod: CPTII,S$GLB,, | Performed by: INTERNAL MEDICINE

## 2023-05-19 PROCEDURE — 1159F MED LIST DOCD IN RCRD: CPT | Mod: CPTII,S$GLB,, | Performed by: INTERNAL MEDICINE

## 2023-05-19 PROCEDURE — 99999 PR PBB SHADOW E&M-EST. PATIENT-LVL III: ICD-10-PCS | Mod: PBBFAC,,, | Performed by: INTERNAL MEDICINE

## 2023-05-19 PROCEDURE — 3074F SYST BP LT 130 MM HG: CPT | Mod: CPTII,S$GLB,, | Performed by: INTERNAL MEDICINE

## 2023-05-19 PROCEDURE — 80053 COMPREHEN METABOLIC PANEL: CPT | Performed by: INTERNAL MEDICINE

## 2023-05-19 PROCEDURE — 1159F PR MEDICATION LIST DOCUMENTED IN MEDICAL RECORD: ICD-10-PCS | Mod: CPTII,S$GLB,, | Performed by: INTERNAL MEDICINE

## 2023-05-19 PROCEDURE — 85025 COMPLETE CBC W/AUTO DIFF WBC: CPT | Performed by: INTERNAL MEDICINE

## 2023-05-19 PROCEDURE — 3078F DIAST BP <80 MM HG: CPT | Mod: CPTII,S$GLB,, | Performed by: INTERNAL MEDICINE

## 2023-05-19 PROCEDURE — 83036 HEMOGLOBIN GLYCOSYLATED A1C: CPT | Performed by: INTERNAL MEDICINE

## 2023-05-19 PROCEDURE — 99999 PR PBB SHADOW E&M-EST. PATIENT-LVL III: CPT | Mod: PBBFAC,,, | Performed by: INTERNAL MEDICINE

## 2023-05-19 PROCEDURE — 3288F PR FALLS RISK ASSESSMENT DOCUMENTED: ICD-10-PCS | Mod: CPTII,S$GLB,, | Performed by: INTERNAL MEDICINE

## 2023-05-19 PROCEDURE — 3078F PR MOST RECENT DIASTOLIC BLOOD PRESSURE < 80 MM HG: ICD-10-PCS | Mod: CPTII,S$GLB,, | Performed by: INTERNAL MEDICINE

## 2023-05-19 PROCEDURE — 99397 PR PREVENTIVE VISIT,EST,65 & OVER: ICD-10-PCS | Mod: S$GLB,,, | Performed by: INTERNAL MEDICINE

## 2023-05-19 PROCEDURE — 84153 ASSAY OF PSA TOTAL: CPT | Performed by: INTERNAL MEDICINE

## 2023-05-19 PROCEDURE — 84443 ASSAY THYROID STIM HORMONE: CPT | Performed by: INTERNAL MEDICINE

## 2023-05-19 PROCEDURE — 80061 LIPID PANEL: CPT | Performed by: INTERNAL MEDICINE

## 2023-05-19 PROCEDURE — 1101F PT FALLS ASSESS-DOCD LE1/YR: CPT | Mod: CPTII,S$GLB,, | Performed by: INTERNAL MEDICINE

## 2023-05-19 PROCEDURE — 3074F PR MOST RECENT SYSTOLIC BLOOD PRESSURE < 130 MM HG: ICD-10-PCS | Mod: CPTII,S$GLB,, | Performed by: INTERNAL MEDICINE

## 2023-05-19 RX ORDER — DICLOFENAC SODIUM 10 MG/G
2 GEL TOPICAL DAILY
Qty: 200 G | Refills: 5 | Status: SHIPPED | OUTPATIENT
Start: 2023-05-19

## 2023-05-19 NOTE — TELEPHONE ENCOUNTER
PA for Diclofenac Sodium 1% gel started  Decision to be sent via fax  See copied reply below      Fadi Higgins Key: [id number] - PA Case ID: [id number] - Rx #: [id number] help? Call us at [phone]  Status  Sent to [facility]  Drug  Diclofenac Sodium 1% gel  Form  [employer] Electronic PA Form (2017 NCPDP)  Original Claim Info  75 CALL HELP DESK

## 2023-05-19 NOTE — PATIENT INSTRUCTIONS
Fasting labwork today  Urinalysis today    Tetanus shot today  Take shingles shot prescription to pharmacy to get this done. I would wait a week after your tetanus shot to recover before getting your shingles vaccination.     Return to clinic in 1 year or sooner if needed.

## 2023-05-19 NOTE — PROGRESS NOTES
Subjective:       Patient ID: Fadi Higgins Jr. is a 82 y.o. male.    Chief Complaint: Annual Exam      HPI  Fadi Higgins Jr. is a 82 y.o. year old male with history of primary osteoarthritis of L hip s/p LUIS, HLD, OA, BPH, vitiligo presents for establishment of care. Recent fall due to pain, no LOC. Doing well otherwise, no new complaints. Pain is unchanged from previous.    Review of Systems   Constitutional:  Negative for activity change, appetite change, chills, fatigue, fever and unexpected weight change.   HENT:  Negative for congestion, rhinorrhea and sore throat.    Eyes:  Negative for visual disturbance.   Respiratory:  Negative for shortness of breath.    Cardiovascular:  Negative for chest pain.   Gastrointestinal:  Negative for abdominal pain, diarrhea, nausea and vomiting.   Genitourinary:  Negative for difficulty urinating and dysuria.   Musculoskeletal:  Positive for arthralgias and gait problem. Negative for back pain and myalgias.   Skin:  Negative for color change and rash.   Neurological:  Negative for dizziness, weakness and headaches.       Past Medical History:   Diagnosis Date    BPH (benign prostatic hyperplasia)     Chronic constipation     Hyperlipidemia     Osteoarthritis     Primary osteoarthritis of left hip 7/16/2018    Vitiligo         Prior to Admission medications    Medication Sig Start Date End Date Taking? Authorizing Provider   saw palmetto 160 MG capsule Take 160 mg by mouth every evening.    Yes Historical Provider   cholecalciferol, vitamin D3, 2,000 unit Cap Take 1 capsule by mouth every evening.     Historical Provider   diclofenac sodium (VOLTAREN) 1 % Gel Apply 2 g topically once daily. 12/28/21   Scott Joyner MD   tadalafiL (CIALIS) 20 MG Tab Take 1 tablet (20 mg total) by mouth as needed (for ED). Generic cialis ( Tadalafil)  Patient not taking: No sig reported 5/11/21 5/11/22  Mickey Hendrix MD        Past medical history, surgical history, and family medical  "history reviewed and updated as appropriate.    Medications and allergies reviewed.     Objective:          Vitals:    05/19/23 1114   BP: 112/66   BP Location: Right arm   Patient Position: Sitting   Pulse: 62   SpO2: 96%   Weight: 72.9 kg (160 lb 11.5 oz)   Height: 5' 7" (1.702 m)       Body mass index is 25.17 kg/m².  Physical Exam  Constitutional:       General: He is not in acute distress.     Appearance: He is well-developed.   HENT:      Head: Normocephalic and atraumatic.      Nose: Nose normal.   Eyes:      General: No scleral icterus.     Extraocular Movements: Extraocular movements intact.   Neck:      Thyroid: No thyromegaly.      Vascular: No JVD.      Trachea: No tracheal deviation.   Cardiovascular:      Rate and Rhythm: Normal rate and regular rhythm.      Heart sounds: Normal heart sounds. No murmur heard.    No friction rub. No gallop.   Pulmonary:      Effort: Pulmonary effort is normal. No respiratory distress.      Breath sounds: Normal breath sounds. No wheezing or rales.   Abdominal:      General: Bowel sounds are normal. There is no distension.      Palpations: Abdomen is soft. There is no mass.      Tenderness: There is no abdominal tenderness.   Musculoskeletal:         General: Tenderness (L hip, mild) present. No deformity.      Cervical back: Normal range of motion and neck supple.   Lymphadenopathy:      Cervical: No cervical adenopathy.   Skin:     General: Skin is warm and dry.      Findings: No rash.   Neurological:      Mental Status: He is alert and oriented to person, place, and time.      Cranial Nerves: No cranial nerve deficit.      Deep Tendon Reflexes: Reflexes normal.   Psychiatric:         Behavior: Behavior normal.       Assessment:       1. Encounter for annual physical exam    2. Abdominal aortic atherosclerosis    3. Mixed hyperlipidemia    4. Personal history of colonic polyps     5. Elevated blood pressure reading    6. Elevated random blood glucose level    7. " History of left hip replacement    8. Need for tetanus, diphtheria, and acellular pertussis (Tdap) vaccine    9. Need for shingles vaccine    10. Bilateral hearing loss, unspecified hearing loss type    11. History of UTI    12. Prostate cancer screening    13. Left hip pain    14. Nocturia            Plan:     Fadi was seen today for annual exam.    Diagnoses and all orders for this visit:    Encounter for annual physical exam    Abdominal aortic atherosclerosis  Comments:  started on crestor 3/2023, has yet to restart, discussed at length    Mixed hyperlipidemia  Comments:  started on crestor 3/2023, has yet to restart, discussed at length  Orders:  -     Lipid Panel; Future  -     TSH; Future    Personal history of colonic polyps     Elevated blood pressure reading  -     CBC Auto Differential; Future  -     Comprehensive Metabolic Panel; Future    Elevated random blood glucose level  -     Hemoglobin A1C; Future    History of left hip replacement    Need for tetanus, diphtheria, and acellular pertussis (Tdap) vaccine  -     DIPH,PERTUSS,ACEL,,TET VAC,PF, ADULT (ADACEL) 2 Lf-(2.5-5-3-5 mcg)-5Lf/0.5 mL Syrg; Inject 0.5 mLs into the muscle once. for 1 dose    Need for shingles vaccine  -     varicella-zoster gE-AS01B, PF, (SHINGRIX) 50 mcg/0.5 mL injection; Inject 0.5 mLs into the muscle once. for 1 dose    Bilateral hearing loss, unspecified hearing loss type    History of UTI  -     Urinalysis, Reflex to Urine Culture Urine, Clean Catch    Prostate cancer screening  -     PSA, Screening; Future    Left hip pain  -     diclofenac sodium (VOLTAREN) 1 % Gel; Apply 2 g topically once daily.    Nocturia    Other orders  -     Urinalysis Microscopic    Benign exam, obtain routine labs  Tdap today  Shingles prescription printed  Declined covid-19 booster    Follow up in about 1 year (around 5/19/2024).    Scott Joyner MD  Internal Medicine / Primary Care  [facility]  5/19/2023

## 2023-05-29 ENCOUNTER — TELEPHONE (OUTPATIENT)
Dept: INTERNAL MEDICINE | Facility: CLINIC | Age: 83
End: 2023-05-29
Payer: MEDICARE

## 2023-05-29 NOTE — TELEPHONE ENCOUNTER
----- Message from Jacqueline William MA sent at 5/29/2023  9:56 AM CDT -----    ----- Message -----  From: Scott Joyner MD  Sent: 5/29/2023   9:48 AM CDT  To: Ar [facility] Staff    Pt supposed to be on crestor, but does not appear to be taking, please reinforce importance of being on cholesterol lowering medication to decrease chance of heart attack / stroke. Follow up as scheduled.     If he needs refills, please let office know.

## 2023-08-24 ENCOUNTER — HOSPITAL ENCOUNTER (EMERGENCY)
Facility: HOSPITAL | Age: 83
Discharge: HOME | End: 2023-08-24
Attending: STUDENT IN AN ORGANIZED HEALTH CARE EDUCATION/TRAINING PROGRAM
Payer: MEDICARE

## 2023-08-24 VITALS
SYSTOLIC BLOOD PRESSURE: 149 MMHG | BODY MASS INDEX: 24.33 KG/M2 | WEIGHT: 155 LBS | DIASTOLIC BLOOD PRESSURE: 73 MMHG | RESPIRATION RATE: 16 BRPM | TEMPERATURE: 98 F | HEART RATE: 59 BPM | HEIGHT: 67 IN | OXYGEN SATURATION: 98 %

## 2023-08-24 DIAGNOSIS — M54.9 BACK PAIN, UNSPECIFIED BACK LOCATION, UNSPECIFIED BACK PAIN LATERALITY, UNSPECIFIED CHRONICITY: Primary | ICD-10-CM

## 2023-08-24 PROCEDURE — 25000003 PHARM REV CODE 250: Performed by: STUDENT IN AN ORGANIZED HEALTH CARE EDUCATION/TRAINING PROGRAM

## 2023-08-24 PROCEDURE — 99284 EMERGENCY DEPT VISIT MOD MDM: CPT | Mod: 25

## 2023-08-24 RX ORDER — LIDOCAINE 50 MG/G
1 PATCH TOPICAL DAILY
Qty: 5 PATCH | Refills: 0 | Status: SHIPPED | OUTPATIENT
Start: 2023-08-24 | End: 2023-08-29

## 2023-08-24 RX ORDER — ACETAMINOPHEN 500 MG
1000 TABLET ORAL
Status: COMPLETED | OUTPATIENT
Start: 2023-08-24 | End: 2023-08-24

## 2023-08-24 RX ORDER — IBUPROFEN 600 MG/1
600 TABLET ORAL
Status: COMPLETED | OUTPATIENT
Start: 2023-08-24 | End: 2023-08-24

## 2023-08-24 RX ADMIN — IBUPROFEN 600 MG: 600 TABLET ORAL at 04:08

## 2023-08-24 RX ADMIN — ACETAMINOPHEN 1000 MG: 500 TABLET ORAL at 04:08

## 2023-08-24 NOTE — PROVIDER PROGRESS NOTES - EMERGENCY DEPT.
Encounter Date: 8/24/2023    ED Physician Progress Notes          ED Physician Hand-off Note:    ED Course: I assumed care of patient from off-going ED physician, Dr. Calhoun.  Briefly, Patient is presented for back pain after bending over to pick stuff up.  Has some lower lumbar spine midline tenderness with some leftward radiation.     At the time of signout plan was pending CT lumbar and reassessment.  CT scan shows no acute abnormalities but some chronic changes.  Discussed results with patient.  Discussed follow up with PCP.  Discussed strict return precautions.  Discussed symptomatic treatment.  All questions answered at this time.    Disposition: Discharge    Patient comfortable with plan. Patient counseled regarding exam, results, diagnosis, treatment, and plan.    Impression:  Lower back strain    Final diagnoses:  [M54.9] Back pain, unspecified back location, unspecified back pain laterality, unspecified chronicity (Primary)

## 2023-08-24 NOTE — ED PROVIDER NOTES
Encounter Date: 8/24/2023       History     Chief Complaint   Patient presents with    Back Pain     L side lower back hurts     HPI    83-year-old male past medical history BPH, hyperlipidemia who presents to the ER for evaluation of back pain.  The patient states that he was bending over to clean his car yesterday, and then suddenly felt a sharp pain in his low back.  It has been slightly persistent today, no meds taken at home.  He is fairly healthy otherwise, takes few other meds.  No falls or trauma.  He denies any urinary incontinence, saddle anesthesia, fevers, chills, or any other inciting event.      Review of patient's allergies indicates:  No Known Allergies  Past Medical History:   Diagnosis Date    BPH (benign prostatic hyperplasia)     Chronic constipation     Hyperlipidemia     Osteoarthritis     Primary osteoarthritis of left hip 7/16/2018    Vitiligo      Past Surgical History:   Procedure Laterality Date    CATARACT EXTRACTION W/  INTRAOCULAR LENS IMPLANT  2011    OU w/ dr. bryant    COLONOSCOPY N/A 4/4/2022    Procedure: COLONOSCOPY;  Surgeon: Lauren Rodriguez MD;  Location: [facility] ([address] [place] [address]);  Service: Endoscopy;  Laterality: N/A;  covid test 4/1/22 sarah, prep instr mailed -ml    COLONOSCOPY N/A 10/6/2022    Procedure: COLONOSCOPY;  Surgeon: Lauren Rodriguez MD;  [facility]  Service: Endoscopy;  Laterality: N/A;  October 2022 7/29 fully vaccinated; instructions mailed-    EYE SURGERY      HIP ARTHROPLASTY Left 5/14/2019    Procedure: ARTHROPLASTY, HIP;  Surgeon: Miki Navarro MD;  Location: [facility];  Service: Orthopedics;  Laterality: Left;    JOINT REPLACEMENT Left 05/14/2019    LUIS     Family History   Problem Relation Age of Onset    Kidney disease Mother         diabetic    Diabetes Mother     Glaucoma Mother     Blindness Mother     Rheum arthritis Father     Macular degeneration Neg Hx     Retinal detachment Neg Hx      Social History     Tobacco Use     Smoking status: Former     Current packs/day: 0.00     Average packs/day: 1 pack/day for 5.0 years (5.0 ttl pk-yrs)     Types: Cigarettes     Start date: 1962     Quit date: 1967     Years since quittin.9    Smokeless tobacco: Never   Substance Use Topics    Alcohol use: No    Drug use: No     Review of Systems   Constitutional:  Negative for fever.   HENT:  Negative for sore throat.    Respiratory:  Negative for shortness of breath.    Cardiovascular:  Negative for chest pain.   Gastrointestinal:  Negative for nausea.   Genitourinary:  Negative for dysuria.   Musculoskeletal:  Positive for back pain.   Skin:  Negative for rash.   Neurological:  Negative for weakness.   Hematological:  Does not bruise/bleed easily.       Physical Exam     Initial Vitals [23 1334]   BP Pulse Resp Temp SpO2   (!) 140/73 61 18 98.1 °F (36.7 °C) 98 %      MAP       --         Physical Exam    Nursing note and vitals reviewed.  Constitutional: He appears well-developed and well-nourished.   HENT:   Head: Normocephalic and atraumatic.   Eyes: EOM are normal.   Neck: Neck supple.   Normal range of motion.  Cardiovascular:  Normal rate and regular rhythm.           Pulmonary/Chest: Breath sounds normal. No respiratory distress.   Musculoskeletal:         General: No edema.      Cervical back: Normal range of motion and neck supple.      Comments: BACK:  No deformities of the low back, no step-offs, mild low back tenderness to palpation, no abrasions or signs of trauma     Neurological: He is alert and oriented to person, place, and time.   5/5 strength bilateral upper and lower extremities, ambulatory without issue   Skin: Skin is warm and dry.   Psychiatric: He has a normal mood and affect. Thought content normal.         ED Course   Procedures  Labs Reviewed - No data to display       Imaging Results              CT Lumbar Spine Without Contrast (In process)                      Medications   ibuprofen tablet 600  mg (600 mg Oral Given 8/24/23 1607)   acetaminophen tablet 1,000 mg (1,000 mg Oral Given 8/24/23 1601)     Medical Decision Making  83-year-old male presenting with low back pain.  Vital signs stable in emergency room, normal physical exam, no focal neurologic deficits.  No urinary incontinence.  He was given p.o. meds for pain.  Pending CT lumbar at shift change, oncoming Dr. Vargas to follow.     Amount and/or Complexity of Data Reviewed  Radiology: ordered. Decision-making details documented in ED Course.    Risk  OTC drugs.  Prescription drug management.                               Clinical Impression:   Final diagnoses:  [M54.9] Back pain, unspecified back location, unspecified back pain laterality, unspecified chronicity (Primary)               Keith Calhoun MD  08/24/23 [id number]

## 2023-08-24 NOTE — ED TRIAGE NOTES
Pt arriving to ED c/o mid lower back pain. Was washing car yesterday, bent over to wash tires and started hurting when came up.

## 2023-08-24 NOTE — ED NOTES
Patient identifiers verified and correct for Fadi Higgins   LOC: The patient is awake, alert and aware of environment with an appropriate affect, the patient is oriented x 3 and speaking appropriately.   APPEARANCE: Patient appears comfortable and in no acute distress, patient is clean and well groomed.  SKIN: The skin is warm and dry, color consistent with ethnicity, patient has normal skin turgor and moist mucus membranes, skin intact, no breakdown or bruising noted.   MUSCULOSKELETAL: Patient moving all extremities spontaneously, no swelling noted. Pt c/o lower back pain  RESPIRATORY: Airway is open and patent, respirations are spontaneous, patient has a normal effort and rate, no accessory muscle use noted, O2 sats noted at 97% on room air.  CARDIAC:  Patient has a normal rate, no edema noted, capillary refill < 3 seconds.   GASTRO: Soft and non tender to palpation, no distention noted, normoactive bowel sounds present in all four quadrants. Pt states bowel movements have been regular.  : Pt denies any pain or frequency with urination.  NEURO: Pt opens eyes spontaneously, behavior appropriate to situation, follows commands, facial expression symmetrical, bilateral hand grasp equal and even, purposeful motor response noted, normal sensation in all extremities when touched with a finger.

## 2023-10-06 ENCOUNTER — TELEPHONE (OUTPATIENT)
Dept: UROLOGY | Facility: CLINIC | Age: 83
End: 2023-10-06
Payer: MEDICARE

## 2023-10-06 NOTE — TELEPHONE ENCOUNTER
----- Message from Susanne Ibarra LPN sent at 10/6/2023 10:26 AM CDT -----  Contact: [phone]    ----- Message -----  From: Kaylee Cummings  Sent: 10/6/2023   9:16 AM CDT  To: Simeon SHANE Staff    Pt is requesting a call back to schedule an appt about the skin around his penis. The first available appt is 01/05/24. Pt would like to be seen sooner. Please call pt to schedule a sooner appt.                  Thank you

## 2023-12-05 ENCOUNTER — OFFICE VISIT (OUTPATIENT)
Dept: UROLOGY | Facility: CLINIC | Age: 83
End: 2023-12-05
Payer: MEDICARE

## 2023-12-05 ENCOUNTER — HOSPITAL ENCOUNTER (OUTPATIENT)
Dept: CARDIOLOGY | Facility: CLINIC | Age: 83
Discharge: HOME | End: 2023-12-05
Payer: MEDICARE

## 2023-12-05 ENCOUNTER — TELEPHONE (OUTPATIENT)
Dept: UROLOGY | Facility: CLINIC | Age: 83
End: 2023-12-05

## 2023-12-05 ENCOUNTER — LAB VISIT (OUTPATIENT)
Dept: LAB | Facility: HOSPITAL | Age: 83
End: 2023-12-05
Attending: UROLOGY
Payer: MEDICARE

## 2023-12-05 VITALS
BODY MASS INDEX: 24.33 KG/M2 | DIASTOLIC BLOOD PRESSURE: 70 MMHG | SYSTOLIC BLOOD PRESSURE: 115 MMHG | HEART RATE: 70 BPM | WEIGHT: 155 LBS | HEIGHT: 67 IN

## 2023-12-05 DIAGNOSIS — N47.1 PHIMOSIS OF PENIS: ICD-10-CM

## 2023-12-05 DIAGNOSIS — Z01.818 PRE-OP EVALUATION: ICD-10-CM

## 2023-12-05 DIAGNOSIS — N47.1 PHIMOSIS OF PENIS: Primary | ICD-10-CM

## 2023-12-05 LAB
ALBUMIN SERPL BCP-MCNC: 3.7 G/DL (ref 3.5–5.2)
ALP SERPL-CCNC: 89 U/L (ref 55–135)
ALT SERPL W/O P-5'-P-CCNC: 22 U/L (ref 10–44)
ANION GAP SERPL CALC-SCNC: 6 MMOL/L (ref 8–16)
AST SERPL-CCNC: 22 U/L (ref 10–40)
BILIRUB SERPL-MCNC: 0.4 MG/DL (ref 0.1–1)
BUN SERPL-MCNC: 14 MG/DL (ref 8–23)
CALCIUM SERPL-MCNC: 9.2 MG/DL (ref 8.7–10.5)
CHLORIDE SERPL-SCNC: 108 MMOL/L (ref 95–110)
CO2 SERPL-SCNC: 28 MMOL/L (ref 23–29)
CREAT SERPL-MCNC: 1.2 MG/DL (ref 0.5–1.4)
ERYTHROCYTE [DISTWIDTH] IN BLOOD BY AUTOMATED COUNT: 12.5 % (ref 11.5–14.5)
EST. GFR  (NO RACE VARIABLE): >60 ML/MIN/1.73 M^2
GLUCOSE SERPL-MCNC: 74 MG/DL (ref 70–110)
HCT VFR BLD AUTO: 39.1 % (ref 40–54)
HGB BLD-MCNC: 12.9 G/DL (ref 14–18)
MCH RBC QN AUTO: 27.6 PG (ref 27–31)
MCHC RBC AUTO-ENTMCNC: 33 G/DL (ref 32–36)
MCV RBC AUTO: 84 FL (ref 82–98)
PLATELET # BLD AUTO: 250 K/UL (ref 150–450)
PMV BLD AUTO: 10.6 FL (ref 9.2–12.9)
POTASSIUM SERPL-SCNC: 4.2 MMOL/L (ref 3.5–5.1)
PROT SERPL-MCNC: 7.6 G/DL (ref 6–8.4)
RBC # BLD AUTO: 4.68 M/UL (ref 4.6–6.2)
SODIUM SERPL-SCNC: 142 MMOL/L (ref 136–145)
WBC # BLD AUTO: 5.14 K/UL (ref 3.9–12.7)

## 2023-12-05 PROCEDURE — 93010 ELECTROCARDIOGRAM REPORT: CPT | Mod: S$GLB,,, | Performed by: INTERNAL MEDICINE

## 2023-12-05 PROCEDURE — 99213 PR OFFICE/OUTPT VISIT, EST, LEVL III, 20-29 MIN: ICD-10-PCS | Mod: S$GLB,,, | Performed by: UROLOGY

## 2023-12-05 PROCEDURE — 85027 COMPLETE CBC AUTOMATED: CPT | Performed by: UROLOGY

## 2023-12-05 PROCEDURE — 1159F MED LIST DOCD IN RCRD: CPT | Mod: CPTII,S$GLB,, | Performed by: UROLOGY

## 2023-12-05 PROCEDURE — 99999 PR PBB SHADOW E&M-EST. PATIENT-LVL III: CPT | Mod: PBBFAC,,, | Performed by: UROLOGY

## 2023-12-05 PROCEDURE — 1101F PT FALLS ASSESS-DOCD LE1/YR: CPT | Mod: CPTII,S$GLB,, | Performed by: UROLOGY

## 2023-12-05 PROCEDURE — 3078F PR MOST RECENT DIASTOLIC BLOOD PRESSURE < 80 MM HG: ICD-10-PCS | Mod: CPTII,S$GLB,, | Performed by: UROLOGY

## 2023-12-05 PROCEDURE — 99999 PR PBB SHADOW E&M-EST. PATIENT-LVL III: ICD-10-PCS | Mod: PBBFAC,,, | Performed by: UROLOGY

## 2023-12-05 PROCEDURE — 1101F PR PT FALLS ASSESS DOC 0-1 FALLS W/OUT INJ PAST YR: ICD-10-PCS | Mod: CPTII,S$GLB,, | Performed by: UROLOGY

## 2023-12-05 PROCEDURE — 3288F PR FALLS RISK ASSESSMENT DOCUMENTED: ICD-10-PCS | Mod: CPTII,S$GLB,, | Performed by: UROLOGY

## 2023-12-05 PROCEDURE — 1126F AMNT PAIN NOTED NONE PRSNT: CPT | Mod: CPTII,S$GLB,, | Performed by: UROLOGY

## 2023-12-05 PROCEDURE — 36415 COLL VENOUS BLD VENIPUNCTURE: CPT | Performed by: UROLOGY

## 2023-12-05 PROCEDURE — 3288F FALL RISK ASSESSMENT DOCD: CPT | Mod: CPTII,S$GLB,, | Performed by: UROLOGY

## 2023-12-05 PROCEDURE — 1159F PR MEDICATION LIST DOCUMENTED IN MEDICAL RECORD: ICD-10-PCS | Mod: CPTII,S$GLB,, | Performed by: UROLOGY

## 2023-12-05 PROCEDURE — 93005 ELECTROCARDIOGRAM TRACING: CPT | Mod: S$GLB,,, | Performed by: UROLOGY

## 2023-12-05 PROCEDURE — 3078F DIAST BP <80 MM HG: CPT | Mod: CPTII,S$GLB,, | Performed by: UROLOGY

## 2023-12-05 PROCEDURE — 93005 EKG 12-LEAD: ICD-10-PCS | Mod: S$GLB,,, | Performed by: UROLOGY

## 2023-12-05 PROCEDURE — 3074F PR MOST RECENT SYSTOLIC BLOOD PRESSURE < 130 MM HG: ICD-10-PCS | Mod: CPTII,S$GLB,, | Performed by: UROLOGY

## 2023-12-05 PROCEDURE — 80053 COMPREHEN METABOLIC PANEL: CPT | Performed by: UROLOGY

## 2023-12-05 PROCEDURE — 3074F SYST BP LT 130 MM HG: CPT | Mod: CPTII,S$GLB,, | Performed by: UROLOGY

## 2023-12-05 PROCEDURE — 99213 OFFICE O/P EST LOW 20 MIN: CPT | Mod: S$GLB,,, | Performed by: UROLOGY

## 2023-12-05 PROCEDURE — 93010 EKG 12-LEAD: ICD-10-PCS | Mod: S$GLB,,, | Performed by: INTERNAL MEDICINE

## 2023-12-05 PROCEDURE — 1126F PR PAIN SEVERITY QUANTIFIED, NO PAIN PRESENT: ICD-10-PCS | Mod: CPTII,S$GLB,, | Performed by: UROLOGY

## 2023-12-05 RX ORDER — CETIRIZINE HYDROCHLORIDE 10 MG/1
10 TABLET ORAL NIGHTLY
COMMUNITY
Start: 2023-11-15

## 2023-12-05 NOTE — PROGRESS NOTES
CC: difficulty of retracting the foreskin    Fadi Higgins Jr. is a 83 y.o. man who is here for the evaluation of Other (Phimosis )    A new pt referred by his PCP, Scott Joyner MD   C/o difficulty of retracting the foreskin and he is bothered by it.  He is interested in circumcision.    Past Medical History:   Diagnosis Date    BPH (benign prostatic hyperplasia)     Chronic constipation     Hyperlipidemia     Osteoarthritis     Primary osteoarthritis of left hip 2018    Vitiligo      Past Surgical History:   Procedure Laterality Date    CATARACT EXTRACTION W/  INTRAOCULAR LENS IMPLANT      OU w/ dr. bryant    COLONOSCOPY N/A 2022    Procedure: COLONOSCOPY;  Surgeon: Lauren Rodriguez MD;  Location: [facility] (4TH FLR);  Service: Endoscopy;  Laterality: N/A;  covid test 22 sarah, prep instr mailed -ml    COLONOSCOPY N/A 10/6/2022    Procedure: COLONOSCOPY;  Surgeon: Lauren Rodriguez MD;  Location: [facility] (4TH FLR);  Service: Endoscopy;  Laterality: N/A;   fully vaccinated; instructions mailed-    EYE SURGERY      HIP ARTHROPLASTY Left 2019    Procedure: ARTHROPLASTY, HIP;  Surgeon: Miki Navarro MD;  Location: [facility] OR;  Service: Orthopedics;  Laterality: Left;    JOINT REPLACEMENT Left 2019    LUIS     Social History     Tobacco Use    Smoking status: Former     Current packs/day: 0.00     Average packs/day: 1 pack/day for 5.0 years (5.0 ttl pk-yrs)     Types: Cigarettes     Start date: 1962     Quit date: 1967     Years since quittin.2    Smokeless tobacco: Never   Substance Use Topics    Alcohol use: No    Drug use: No     Family History   Problem Relation Age of Onset    Kidney disease Mother         diabetic    Diabetes Mother     Glaucoma Mother     Blindness Mother     Rheum arthritis Father     Macular degeneration Neg Hx     Retinal detachment Neg Hx      Allergy:  Review of patient's allergies indicates:  No Known  Allergies  Outpatient Encounter Medications as of 12/5/2023   Medication Sig Dispense Refill    cetirizine (ZYRTEC) 10 MG tablet Take 10 mg by mouth every evening.      cholecalciferol, vitamin D3, 2,000 unit Cap Take 1 capsule by mouth every evening.       diclofenac sodium (VOLTAREN) 1 % Gel Apply 2 g topically once daily. 200 g 5    rosuvastatin (CRESTOR) 20 MG tablet TAKE 1 TABLET(20 MG) BY MOUTH EVERY EVENING 90 tablet 1    saw palmetto 160 MG capsule Take 160 mg by mouth every evening.        No facility-administered encounter medications on file as of 12/5/2023.     Review of Systems   ROS  Physical Exam     Vitals:    12/05/23 1352   BP: 115/70   Pulse: 70     Physical Exam  Constitutional:       General: He is not in acute distress.     Appearance: He is well-developed. He is not diaphoretic.   HENT:      Head: Normocephalic and atraumatic.      Right Ear: External ear normal.      Left Ear: External ear normal.      Nose: Nose normal.   Eyes:      Conjunctiva/sclera: Conjunctivae normal.      Pupils: Pupils are equal, round, and reactive to light.   Neck:      Thyroid: No thyromegaly.      Vascular: No JVD.      Trachea: No tracheal deviation.   Cardiovascular:      Rate and Rhythm: Normal rate and regular rhythm.      Heart sounds: Normal heart sounds. No murmur heard.     No friction rub. No gallop.   Pulmonary:      Effort: Pulmonary effort is normal. No respiratory distress.      Breath sounds: Normal breath sounds. No wheezing.   Chest:      Chest wall: No tenderness.   Abdominal:      General: Bowel sounds are normal. There is no distension.      Palpations: Abdomen is soft. There is no mass.      Tenderness: There is no abdominal tenderness. There is no guarding or rebound.   Genitourinary:     Penis: No tenderness.       Prostate: Normal.      Rectum: Normal.      Comments: Penis: redundant foreskin with phimosis  Musculoskeletal:         General: No tenderness or deformity. Normal range of motion.       Cervical back: Normal range of motion and neck supple.   Lymphadenopathy:      Cervical: No cervical adenopathy.   Skin:     General: Skin is warm and dry.   Neurological:      Mental Status: He is alert and oriented to person, place, and time.   Psychiatric:         Behavior: Behavior normal.         Thought Content: Thought content normal.         LABS:  Lab Results   Component Value Date    PSA 2.0 05/19/2023    PSA 1.8 01/06/2022    PSA 0.65 09/14/2012    PSA 0.55 11/30/2010    PSA 0.56 01/04/2010    PSA 0.52 12/19/2008    PSA 0.55 11/21/2008    PSA 0.5 12/10/2007    PSA 0.4 08/11/2006    PSA 0.4 11/22/2005    PSADIAG 1.3 05/11/2021    PSADIAG 1.4 10/16/2019    PSADIAG 1.3 08/13/2018    PSADIAG 0.82 12/30/2015    PSADIAG 0.85 10/01/2013     Results for orders placed or performed in visit on 05/11/21   Prostate Specific Antigen, Diagnostic   Result Value Ref Range    PSA Diagnostic 1.3 0.00 - 4.00 ng/mL   Results for orders placed or performed in visit on 10/16/19   Prostate Specific Antigen, Diagnostic   Result Value Ref Range    PSA Diagnostic 1.4 0.00 - 4.00 ng/mL   Results for orders placed or performed in visit on 08/13/18   Prostate Specific Antigen, Diagnostic   Result Value Ref Range    PSA Diagnostic 1.3 0.00 - 4.00 ng/mL     Lab Results   Component Value Date    CREATININE 0.9 05/19/2023    CREATININE 1.0 02/09/2023    CREATININE 0.9 01/06/2022     No results found for this or any previous visit.  Urine Culture, Routine   Date Value Ref Range Status   02/09/2023 ESCHERICHIA COLI  >100,000 cfu/ml   (A)  Final     Hemoglobin A1C   Date Value Ref Range Status   05/19/2023 5.5 4.0 - 5.6 % Final     Comment:     ADA Screening Guidelines:  5.7-6.4%  Consistent with prediabetes  >or=6.5%  Consistent with diabetes    High levels of fetal hemoglobin interfere with the HbA1C  assay. Heterozygous hemoglobin variants (HbS, HgC, etc)do  not significantly interfere with this assay.   However, presence of  multiple variants may affect accuracy.     01/06/2022 5.5 4.0 - 5.6 % Final     Comment:     ADA Screening Guidelines:  5.7-6.4%  Consistent with prediabetes  >or=6.5%  Consistent with diabetes    High levels of fetal hemoglobin interfere with the HbA1C  assay. Heterozygous hemoglobin variants (HbS, HgC, etc)do  not significantly interfere with this assay.   However, presence of multiple variants may affect accuracy.         Radiology:    Assessment and Plan:  Fadi was seen today for other.    Diagnoses and all orders for this visit:    Phimosis of penis  -     EKG 12-lead; Future    Pre-op evaluation  -     Comprehensive Metabolic Panel; Future  -     EKG 12-lead; Future  -     CBC Without Differential; Future    The patient will be scheduled for a circumcision.  The risks and benefits were explained to the patient in detail and consent was obtained.  The risks include but are not limited to bleeding, infection, pain,  fistula formation (hole in the urine tube), meatal stenosis (ulceration/scarring at the tip of the penis, skin bridge, removal of too much or too little skin, buried penis, penile swelling of discomfort, infection of incision or bleeding (usually mild) that may result in a hospital treatment.  The Patient was given the alternatives of observation and medical therapy as well. The patient understands these risks and has agreed to proceed with surgery.      Follow-up:  Follow up in about 29 days (around 1/3/2024), or circumcsion.

## 2023-12-05 NOTE — PATIENT INSTRUCTIONS
Anat (Stopover) Chau, my surgery scheduler will schedule your surgery ([phone]).  The risks and benefits of the procedure were discussed with the patient in detail.    The patient was told to stop all blood thinners prior to surgery.

## 2023-12-05 NOTE — TELEPHONE ENCOUNTER
Spoke to the pt while in clinic to offer surgery date of 1/3/24, pt accepted. Went over pre-op instructions while in clinic and informed the pt I will the day before surgery with arrival time and pre-op instructions. Pt verbalized understanding.

## 2023-12-20 NOTE — ANESTHESIA PAT ROS NOTE
12/20/2023  Fadi Higgins Jr. is a 83 y.o., male.      Pre-op Assessment    I have reviewed the NPO Status.      Review of Systems  Anesthesia Hx:  No problems with previous Anesthesia  Pt is JW, no blood products. History of prior surgery of interest to airway management or planning:          Denies Family Hx of Anesthesia complications.    Denies Personal Hx of Anesthesia complications.                    Social:  Former Smoker, No Alcohol Use       Hematology/Oncology:  Hematology Normal   Oncology Normal                                   EENT/Dental:  EENT/Dental Normal           Cardiovascular:  Exercise tolerance: good   Denies Pacemaker.  Denies Hypertension.   Denies MI.        Denies Angina.     hyperlipidemia     Functional Capacity good / => 4 METS                       Other Cardiac Conditions Abdominal aortic atherosclerosis  Pulmonary:  Pulmonary Normal   Denies COPD.  Denies Asthma.   Denies Shortness of breath.   Denies Sleep Apnea.                Renal/:    BPH              Hepatic/GI:  Hepatic/GI Normal          Bowel Conditions: (chronic constipation)         Musculoskeletal:  Arthritis      Joint Disease:  Arthritis, Osteoarthritis Primary OA of left hip s/p LUIS, arthritis in hands per pt         Neurological:  Neurology Normal Denies TIA.  Denies CVA.    Denies Seizures.        Osteoarthritis                           Endocrine:  Endocrine Normal            Dermatological:   Skin Symptoms/Problems:  Hx of vitiligo  Psych:  Psychiatric Normal                         Anesthesia Assessment: Preoperative EQUATION    Planned Procedure: Procedure(s) (LRB):  CIRCUMCISION (N/A)  Requested Anesthesia Type:General  Surgeon: Mickey Hendrix MD  Service: Urology  Known or anticipated Date of Surgery:1/3/2024    Surgeon notes: reviewed    Electronic QUestionnaire Assessment completed via nurse  interview with patient.        Triage considerations:     The patient has no apparent active cardiac condition (No unstable coronary Syndrome such as severe unstable angina or recent [<1 month] myocardial infarction, decompensated CHF, severe valvular   disease or significant arrhythmia)    Previous anesthesia records:GETA and No problems  10/06/22 colonoscopy, gen anes, ASA 2  Airway:  Mallampati: I   Mouth Opening: Normal  TM Distance: Normal  Tongue: Normal  Neck ROM: Normal ROM   Dental:  Intact      Last PCP note: 6-12 months ago , within [facility]   Subspecialty notes: Urology    Other important co-morbidities: HLD and primary OA of left hip s/p LUIS, abd aortic atherosclerosis, h/o UTI, bilat hearing loss, chronic constipation, vitiligo, BPH, phimosis of penis       Tests already available:  Available tests,  within 1 month , within [facility] .   12/05/23 CBC, CMP, EKG            Instructions given. (See in Nurse's note)    Optimization:  Anesthesia Preop Clinic Assessment  Indicated--NOT indicated          Plan:    Testing:  None Needed    Navigation:  Straight Line to surgery.               No tests, anesthesia preop clinic visit, or consult required.

## 2023-12-20 NOTE — PRE-PROCEDURE INSTRUCTIONS
PreOp and Medication Instructions given and reviewed:   - Verbal medication instructions (instructed to hold vitamins, herbal supplements and NSAIDS one week prior to surgery)  - NPO guidelines, unless otherwise instructed by Surgeon's Office  - Arrival place and time to be given by the Surgeon's Office   - Shower with antibacterial soap   - No makeup or moisturizer to face   - No perfume/cologne, powder, lotions, deodorant or aftershave   - Leave all jewelry, including body piercings, and valuables at home.  - Arrange for someone to drive you home following surgery; will not be allowed to leave the surgical facility alone or drive self home following sedation and anesthesia.    Pt verbalized understanding.  Pt instructed to call POC with any questions or changes.

## 2024-01-09 ENCOUNTER — TELEPHONE (OUTPATIENT)
Dept: UROLOGY | Facility: CLINIC | Age: 84
End: 2024-01-09
Payer: MEDICARE

## 2024-01-09 ENCOUNTER — ANESTHESIA EVENT (OUTPATIENT)
Dept: SURGERY | Facility: HOSPITAL | Age: 84
End: 2024-01-09
Payer: MEDICARE

## 2024-01-09 RX ORDER — ACETAMINOPHEN 500 MG
1000 TABLET ORAL
OUTPATIENT
Start: 2024-01-10 | End: 2024-01-10

## 2024-01-09 NOTE — TELEPHONE ENCOUNTER
Called pt to confirm arrival time of 10am for procedure on 1/10/24. Gave pt NPO instructions and gave pt opportunity to ask questions. Pt verbalized understanding.

## 2024-01-09 NOTE — ANESTHESIA PREPROCEDURE EVALUATION
[facility] [address]  Anesthesia Pre-Operative Evaluation         Patient Name: Fadi Higgins Jr.  YOB: 1940  MRN: [id number]    SUBJECTIVE:     Pre-operative evaluation for Procedure(s) (LRB):  CIRCUMCISION (N/A)     01/09/2024    Fadi Higgins Jr. is a 83 y.o. male w/ a significant PMHx of Orthodoxy, aortic atherosclerosis, BPH, and phimosis undergoing circumcision.    Patient now presents for the above procedure(s).    NO PREVIOUS ECHOs.    Prev airway: None documented.        Patient Active Problem List   Diagnosis    Hyperlipidemia    BPH with urinary obstruction    Nevus of conjunctiva - Left Eye    Status post cataract extraction and insertion of intraocular lens - Both Eyes    Abdominal aortic atherosclerosis    Vitamin D insufficiency    Vitreous floaters of right eye    Primary osteoarthritis of left hip    Nocturia    OAB (overactive bladder)    OAG (open angle glaucoma) suspect, low risk, bilateral    Subjective visual disturbance of both eyes    Phimosis of penis       Review of patient's allergies indicates:  No Known Allergies    Current Inpatient Medications:      No current facility-administered medications on file prior to encounter.     Current Outpatient Medications on File Prior to Encounter   Medication Sig Dispense Refill    cholecalciferol, vitamin D3, 2,000 unit Cap Take 1 capsule by mouth every evening.       rosuvastatin (CRESTOR) 20 MG tablet TAKE 1 TABLET(20 MG) BY MOUTH EVERY EVENING 90 tablet 1    saw palmetto 160 MG capsule Take 160 mg by mouth every evening.       TURMERIC ORAL Take by mouth.      cetirizine (ZYRTEC) 10 MG tablet Take 10 mg by mouth every evening.      diclofenac sodium (VOLTAREN) 1 % Gel Apply 2 g topically once daily. 200 g 5       Past Surgical History:   Procedure Laterality Date    CATARACT EXTRACTION W/  INTRAOCULAR LENS IMPLANT  2011    OU w/ dr. bryant    COLONOSCOPY N/A 4/4/2022    Procedure: COLONOSCOPY;  Surgeon:  Lauren Rodriguez MD;  Location: [facility] ENDO (4TH FLR);  Service: Endoscopy;  Laterality: N/A;  covid test 22 sarah, prep instr mailed -ml    COLONOSCOPY N/A 10/6/2022    Procedure: COLONOSCOPY;  Surgeon: Lauren Rodriguez MD;  Location: [facility] END[facility] (4TH FLR);  Service: Endoscopy;  Laterality: N/A;   fully vaccinated; instructions mailed-st    EYE SURGERY      HIP ARTHROPLASTY Left 2019    Procedure: ARTHROPLASTY, HIP;  Surgeon: Miki Navarro MD;  Location: [facility] OR;  Service: Orthopedics;  Laterality: Left;    JOINT REPLACEMENT Left 2019    LUIS       Social History     Socioeconomic History    Marital status:    Occupational History    Occupation: retired ex[employer]    Tobacco Use    Smoking status: Former     Current packs/day: 0.00     Average packs/day: 1 pack/day for 5.0 years (5.0 ttl pk-yrs)     Types: Cigarettes     Start date: 1962     Quit date: 1967     Years since quittin.3    Smokeless tobacco: Never   Substance and Sexual Activity    Alcohol use: No    Drug use: No    Sexual activity: Yes       OBJECTIVE:     Vital Signs Range (Last 24H):         Significant Labs:  Lab Results   Component Value Date    WBC 5.14 2023    HGB 12.9 (L) 2023    HCT 39.1 (L) 2023     2023    CHOL 208 (H) 2023    TRIG 63 2023    HDL 46 2023    ALT 22 2023    AST 22 2023     2023    K 4.2 2023     2023    CREATININE 1.2 2023    BUN 14 2023    CO2 28 2023    TSH 1.204 2023    PSA 2.0 2023    INR 1.1 2019    HGBA1C 5.5 2023       Diagnostic Studies: No relevant studies.    EKG:   Results for orders placed or performed during the hospital encounter of 23   EKG 12-lead    Collection Time: 23  2:36 PM    Narrative    Test Reason : N47.1,Z01.818,    Vent. Rate : 058 BPM     Atrial Rate : 058 BPM     P-R Int : 210  ms          QRS Dur : 096 ms      QT Int : 434 ms       P-R-T Axes : 073 -58 001 degrees     QTc Int : 426 ms    Sinus bradycardia with 1st degree A-V block  Left anterior fascicular block  Minimal voltage criteria for LVH, may be normal variant ( R in aVL )  Abnormal ECG  When compared with ECG of 02-SEP-2011 10:16,  No significant change was found  Confirmed by Driss León MD ([id number]) on 12/5/2023 4:54:24 PM    Referred By: JAZMIN CHARLES           Confirmed By:Driss León MD       2D ECHO:  TTE:  No results found for this or any previous visit.      ASSESSMENT/PLAN:       Pre-op Assessment    I have reviewed the Patient Summary Reports.     I have reviewed the Nursing Notes.    I have reviewed the Medications.     Review of Systems  Anesthesia Hx:  No problems with previous Anesthesia   History of prior surgery of interest to airway management or planning:          Denies Family Hx of Anesthesia complications.    Denies Personal Hx of Anesthesia complications.                    Social:  Former Smoker       Hematology/Oncology:  Hematology Normal   Oncology Normal                                   EENT/Dental:  EENT/Dental Normal           Cardiovascular:  Cardiovascular Normal     Denies Hypertension.   Denies MI.         Denies CHF.                                 Pulmonary:  Pulmonary Normal   Denies COPD.  Denies Asthma.     Denies Sleep Apnea.                Renal/:  Renal/ Normal                 Hepatic/GI:  Hepatic/GI Normal     Denies GERD.             Musculoskeletal:  Musculoskeletal Normal                Neurological:  Neurology Normal   Denies CVA.                                    Endocrine:  Endocrine Normal Denies Diabetes.           Dermatological:  Skin Normal    Psych:  Psychiatric Normal                       Anesthesia Plan  Type of Anesthesia, risks & benefits discussed:    Anesthesia Type: Gen ETT  Intra-op Monitoring Plan: Standard ASA Monitors  Post Op Pain Control Plan: multimodal  analgesia and IV/PO Opioids PRN  Induction:  IV  Airway Plan: Direct, Post-Induction  Informed Consent: Informed consent signed with the Patient and all parties understand the risks and agree with anesthesia plan.  All questions answered.   ASA Score: 2  Day of Surgery Review of History & Physical: H&P Update referred to the surgeon/provider.    Ready For Surgery From Anesthesia Perspective.     .

## 2024-01-10 ENCOUNTER — HOSPITAL ENCOUNTER (OUTPATIENT)
Facility: HOSPITAL | Age: 84
Discharge: HOME | End: 2024-01-10
Attending: UROLOGY | Admitting: UROLOGY
Payer: MEDICARE

## 2024-01-10 ENCOUNTER — ANESTHESIA (OUTPATIENT)
Dept: SURGERY | Facility: HOSPITAL | Age: 84
End: 2024-01-10
Payer: MEDICARE

## 2024-01-10 VITALS
OXYGEN SATURATION: 94 % | RESPIRATION RATE: 20 BRPM | TEMPERATURE: 98 F | HEART RATE: 75 BPM | SYSTOLIC BLOOD PRESSURE: 119 MMHG | DIASTOLIC BLOOD PRESSURE: 73 MMHG | WEIGHT: 160 LBS | HEIGHT: 67 IN | BODY MASS INDEX: 25.11 KG/M2

## 2024-01-10 DIAGNOSIS — N47.1 PHIMOSIS: Primary | ICD-10-CM

## 2024-01-10 PROCEDURE — 63600175 PHARM REV CODE 636 W HCPCS

## 2024-01-10 PROCEDURE — 37000008 HC ANESTHESIA 1ST 15 MINUTES: Performed by: UROLOGY

## 2024-01-10 PROCEDURE — 71000044 HC DOSC ROUTINE RECOVERY FIRST HOUR: Performed by: UROLOGY

## 2024-01-10 PROCEDURE — D9220A PRA ANESTHESIA: Mod: ,,, | Performed by: ANESTHESIOLOGY

## 2024-01-10 PROCEDURE — 25000003 PHARM REV CODE 250

## 2024-01-10 PROCEDURE — 71000015 HC POSTOP RECOV 1ST HR: Performed by: UROLOGY

## 2024-01-10 PROCEDURE — 36000707: Performed by: UROLOGY

## 2024-01-10 PROCEDURE — 37000009 HC ANESTHESIA EA ADD 15 MINS: Performed by: UROLOGY

## 2024-01-10 PROCEDURE — 54161 CIRCUM 28 DAYS OR OLDER: CPT | Mod: ,,, | Performed by: UROLOGY

## 2024-01-10 PROCEDURE — 25000003 PHARM REV CODE 250: Performed by: UROLOGY

## 2024-01-10 PROCEDURE — 63600175 PHARM REV CODE 636 W HCPCS: Mod: JZ,JG | Performed by: UROLOGY

## 2024-01-10 PROCEDURE — 36000706: Performed by: UROLOGY

## 2024-01-10 RX ORDER — PROPOFOL 10 MG/ML
VIAL (ML) INTRAVENOUS
Status: DISCONTINUED | OUTPATIENT
Start: 2024-01-10 | End: 2024-01-10

## 2024-01-10 RX ORDER — PHENYLEPHRINE HYDROCHLORIDE 10 MG/ML
INJECTION INTRAVENOUS
Status: DISCONTINUED | OUTPATIENT
Start: 2024-01-10 | End: 2024-01-10

## 2024-01-10 RX ORDER — KETAMINE HCL IN 0.9 % NACL 50 MG/5 ML
SYRINGE (ML) INTRAVENOUS
Status: DISCONTINUED | OUTPATIENT
Start: 2024-01-10 | End: 2024-01-10

## 2024-01-10 RX ORDER — CEFAZOLIN SODIUM 1 G/3ML
INJECTION, POWDER, FOR SOLUTION INTRAMUSCULAR; INTRAVENOUS
Status: DISCONTINUED | OUTPATIENT
Start: 2024-01-10 | End: 2024-01-10

## 2024-01-10 RX ORDER — ROCURONIUM BROMIDE 10 MG/ML
INJECTION, SOLUTION INTRAVENOUS
Status: DISCONTINUED | OUTPATIENT
Start: 2024-01-10 | End: 2024-01-10

## 2024-01-10 RX ORDER — LIDOCAINE HYDROCHLORIDE 20 MG/ML
INJECTION INTRAVENOUS
Status: DISCONTINUED | OUTPATIENT
Start: 2024-01-10 | End: 2024-01-10

## 2024-01-10 RX ORDER — ONDANSETRON 2 MG/ML
INJECTION INTRAMUSCULAR; INTRAVENOUS
Status: DISCONTINUED | OUTPATIENT
Start: 2024-01-10 | End: 2024-01-10

## 2024-01-10 RX ORDER — FENTANYL CITRATE 50 UG/ML
INJECTION, SOLUTION INTRAMUSCULAR; INTRAVENOUS
Status: DISCONTINUED | OUTPATIENT
Start: 2024-01-10 | End: 2024-01-10

## 2024-01-10 RX ORDER — LIDOCAINE HYDROCHLORIDE 10 MG/ML
INJECTION INFILTRATION; PERINEURAL
Status: DISCONTINUED | OUTPATIENT
Start: 2024-01-10 | End: 2024-01-10 | Stop reason: HOSPADM

## 2024-01-10 RX ORDER — DEXAMETHASONE SODIUM PHOSPHATE 4 MG/ML
INJECTION, SOLUTION INTRA-ARTICULAR; INTRALESIONAL; INTRAMUSCULAR; INTRAVENOUS; SOFT TISSUE
Status: DISCONTINUED | OUTPATIENT
Start: 2024-01-10 | End: 2024-01-10

## 2024-01-10 RX ORDER — EPHEDRINE SULFATE 50 MG/ML
INJECTION, SOLUTION INTRAVENOUS
Status: DISCONTINUED | OUTPATIENT
Start: 2024-01-10 | End: 2024-01-10

## 2024-01-10 RX ORDER — OXYCODONE HYDROCHLORIDE 5 MG/1
5 TABLET ORAL EVERY 6 HOURS PRN
Qty: 5 TABLET | Refills: 0 | Status: SHIPPED | OUTPATIENT
Start: 2024-01-10

## 2024-01-10 RX ORDER — HYDROMORPHONE HYDROCHLORIDE 1 MG/ML
0.2 INJECTION, SOLUTION INTRAMUSCULAR; INTRAVENOUS; SUBCUTANEOUS EVERY 5 MIN PRN
Status: DISCONTINUED | OUTPATIENT
Start: 2024-01-10 | End: 2024-01-10 | Stop reason: HOSPADM

## 2024-01-10 RX ORDER — BUPIVACAINE HYDROCHLORIDE 2.5 MG/ML
INJECTION, SOLUTION EPIDURAL; INFILTRATION; INTRACAUDAL
Status: DISCONTINUED | OUTPATIENT
Start: 2024-01-10 | End: 2024-01-10 | Stop reason: HOSPADM

## 2024-01-10 RX ORDER — HALOPERIDOL 5 MG/ML
0.5 INJECTION INTRAMUSCULAR EVERY 10 MIN PRN
Status: DISCONTINUED | OUTPATIENT
Start: 2024-01-10 | End: 2024-01-10 | Stop reason: HOSPADM

## 2024-01-10 RX ADMIN — SODIUM CHLORIDE: 0.9 INJECTION, SOLUTION INTRAVENOUS at 12:01

## 2024-01-10 RX ADMIN — Medication 15 MG: at 01:01

## 2024-01-10 RX ADMIN — ONDANSETRON 4 MG: 2 INJECTION INTRAMUSCULAR; INTRAVENOUS at 01:01

## 2024-01-10 RX ADMIN — SUGAMMADEX 200 MG: 100 INJECTION, SOLUTION INTRAVENOUS at 01:01

## 2024-01-10 RX ADMIN — DEXAMETHASONE SODIUM PHOSPHATE 4 MG: 4 INJECTION, SOLUTION INTRAMUSCULAR; INTRAVENOUS at 12:01

## 2024-01-10 RX ADMIN — EPHEDRINE SULFATE 5 MG: 50 INJECTION INTRAVENOUS at 01:01

## 2024-01-10 RX ADMIN — FENTANYL CITRATE 50 MCG: 50 INJECTION, SOLUTION INTRAMUSCULAR; INTRAVENOUS at 12:01

## 2024-01-10 RX ADMIN — PHENYLEPHRINE HYDROCHLORIDE 100 MCG: 10 INJECTION INTRAVENOUS at 01:01

## 2024-01-10 RX ADMIN — ROCURONIUM BROMIDE 30 MG: 10 INJECTION, SOLUTION INTRAVENOUS at 12:01

## 2024-01-10 RX ADMIN — PROPOFOL 150 MG: 10 INJECTION, EMULSION INTRAVENOUS at 12:01

## 2024-01-10 RX ADMIN — CEFAZOLIN 2 G: 330 INJECTION, POWDER, FOR SOLUTION INTRAMUSCULAR; INTRAVENOUS at 12:01

## 2024-01-10 RX ADMIN — FENTANYL CITRATE 50 MCG: 50 INJECTION, SOLUTION INTRAMUSCULAR; INTRAVENOUS at 01:01

## 2024-01-10 RX ADMIN — LIDOCAINE HYDROCHLORIDE 100 MG: 20 INJECTION INTRAVENOUS at 12:01

## 2024-01-10 NOTE — ANESTHESIA PROCEDURE NOTES
Intubation    Date/Time: 1/10/2024 12:52 PM    Performed by: Alexander Heaton DO  Authorized by: Katja Sams MD    Intubation:     Induction:  Intravenous    Intubated:  Postinduction    Mask Ventilation:  Easy mask    Attempts:  1    Attempted By:  Resident anesthesiologist    Method of Intubation:  Direct    Blade:  Fior 3    Laryngeal View Grade: Grade I - full view of cords      Difficult Airway Encountered?: No      Complications:  None    Airway Device:  Oral endotracheal tube    Airway Device Size:  7.5    Style/Cuff Inflation:  Cuffed    Tube secured:  22    Secured at:  The lips    Placement Verified By:  Capnometry    Complicating Factors:  None    Findings Post-Intubation:  BS equal bilateral and atraumatic/condition of teeth unchanged

## 2024-01-10 NOTE — TRANSFER OF CARE
"Anesthesia Transfer of Care Note    Patient: Fadi Higgins Jr.    Procedure(s) Performed: Procedure(s) (LRB):  CIRCUMCISION (N/A)    Patient location: PACU    Anesthesia Type: general    Transport from OR: Transported from OR on 6-10 L/min O2 by face mask with adequate spontaneous ventilation    Post pain: adequate analgesia    Post assessment: no apparent anesthetic complications and tolerated procedure well    Post vital signs: stable    Level of consciousness: awake, alert and oriented    Nausea/Vomiting: no nausea/vomiting    Complications: none    Transfer of care protocol was followed      Last vitals: Visit Vitals  /78 (BP Location: Left arm, Patient Position: Lying)   Pulse (!) 59   Temp 37.3 °C (99.1 °F) (Oral)   Resp 20   Ht 5' 7" (1.702 m)   Wt 72.6 kg (160 lb)   SpO2 100%   BMI 25.06 kg/m²     "

## 2024-01-10 NOTE — DISCHARGE INSTRUCTIONS
Post Circumcision or Penile Skin Surgery Instructions    No sex or masturbation for SIX weeks  Ok to remove dressing in 1 days  Do not get your incision wet for 2 days  If you had a circumcision, ok to put bacitracin/Neosporin on incision to keep it from sticking to your underwear.  Return to ER or call [phone] and ask to speak with the urology clinic if you have any excessive bleeding or swelling  Use Tylenol or Advil first then use the pain medicine we prescribe for breakthrough pain medicine, do not exceed 4000mg of Tylenol  If you see excessive bleeding or swelling, return to ER

## 2024-01-10 NOTE — OP NOTE
[facility]  Operative Note    Date: 01/10/2024    Pre-Op Diagnosis: phimosis    Post-Op Diagnosis: same    Procedure(s) Performed:   1.  Circumcision     Specimen(s): foreskin    Staff Surgeon: Mickey Hendrix MD    Assistant Surgeon: Yaya Austin MD    Anesthesia: General endotracheal anesthesia    Indications: Fadi Higgins Jr. is a 83 y.o. male with phimosis who presents for a circumcision.      Findings:   Circumcision performed with dorsal slit, no complications    Estimated Blood Loss: min    Drains: none    Procedure in detail:  After risks, benefits and possible complications of circumcision were discussed, the patient elected to under go the procedure and informed consent was obtained.  All questions were answered in the pre-operative area. The patient was transferred to the operative suite and placed in supine position.  SCDs were applied and working.  After adequate anesthesia the patient was prepped and draped in the usual sterile fashion. Time out was preformed, tejal-procedural antibiotic were confirmed.     A marking pen was used to jac our incisions, at the coronal sulcus with the foreskin in the normal anatomic position and 1 cm proximal to the glans in the retracted position.  A 15 blade was used to sharply incise our marked lines.  The foreskin was reduced and removed with electrocautery by connecting the two incisions we just made. The free edges were then re approximated in a interrupted fashion using a 3-0 chromic.     A sterile dressing was applied.  The patient tolerated the procedure well and was transferred to the PACU in stable condition    Disposition: The patient will follow up with Dr. Hendrix in 4-6 weeks.  He was given prescriptions for pain meds and instructed to avoid sex/masturbation x 6 weeks.      Yaya Austin MD

## 2024-01-10 NOTE — DISCHARGE SUMMARY
[facility] (2nd Fl)  Discharge Note  Short Stay    Procedure(s) (LRB):  CIRCUMCISION (N/A)      OUTCOME: Patient tolerated treatment/procedure well without complication and is now ready for discharge.    DISPOSITION: Home or Self Care    FINAL DIAGNOSIS:  Phimosis of penis    FOLLOWUP: In clinic with Dr. Hendrix in 4 weeks    DISCHARGE INSTRUCTIONS:    Discharge Procedure Orders   Notify your health care provider if you experience any of the following:  temperature >100.4     Notify your health care provider if you experience any of the following:  persistent nausea and vomiting or diarrhea     Notify your health care provider if you experience any of the following:  severe uncontrolled pain     Notify your health care provider if you experience any of the following:  redness, tenderness, or signs of infection (pain, swelling, redness, odor or green/yellow discharge around incision site)     Notify your health care provider if you experience any of the following:  worsening rash     Notify your health care provider if you experience any of the following:  persistent dizziness, light-headedness, or visual disturbances        TIME SPENT ON DISCHARGE: 10 minutes

## 2024-01-10 NOTE — H&P
CC: difficulty of retracting the foreskin    Fadi Higgins Jr. is a 83 y.o. man who is here for the evaluation of Other (Phimosis )    A new pt referred by his PCP, Scott Joyner MD   C/o difficulty of retracting the foreskin and he is bothered by it.  He is interested in circumcision.    No changes since clinic.    Past Medical History:   Diagnosis Date    BPH (benign prostatic hyperplasia)     Chronic constipation     Hyperlipidemia     Osteoarthritis     Primary osteoarthritis of left hip 2018    Vitiligo      Past Surgical History:   Procedure Laterality Date    CATARACT EXTRACTION W/  INTRAOCULAR LENS IMPLANT      OU w/ dr. bryant    COLONOSCOPY N/A 2022    Procedure: COLONOSCOPY;  Surgeon: Lauren Rodriguez MD;  [facility]: [facility] ([facility]  Service: Endoscopy;  Laterality: N/A;  covid test 22 sarah, prep instr mailed -ml    COLONOSCOPY N/A 10/6/2022    Procedure: COLONOSCOPY;  Surgeon: Lauren Rodriguez MD;  Location: [facility] ([address] [place] [address]);  Service: Endoscopy;  Laterality: N/A;   fully vaccinated; instructions mailed-    EYE SURGERY      HIP ARTHROPLASTY Left 2019    Procedure: ARTHROPLASTY, HIP;  Surgeon: Miki Navarro MD;  Location: [facility];  Service: Orthopedics;  Laterality: Left;    JOINT REPLACEMENT Left 2019    LUIS     Social History     Tobacco Use    Smoking status: Former     Current packs/day: 0.00     Average packs/day: 1 pack/day for 5.0 years (5.0 ttl pk-yrs)     Types: Cigarettes     Start date: 1962     Quit date: 1967     Years since quittin.2    Smokeless tobacco: Never   Substance Use Topics    Alcohol use: No    Drug use: No     Family History   Problem Relation Age of Onset    Kidney disease Mother         diabetic    Diabetes Mother     Glaucoma Mother     Blindness Mother     Rheum arthritis Father     Macular degeneration Neg Hx     Retinal detachment Neg Hx      Allergy:  Review of patient's allergies  indicates:  No Known Allergies  Outpatient Encounter Medications as of 12/5/2023   Medication Sig Dispense Refill    cetirizine (ZYRTEC) 10 MG tablet Take 10 mg by mouth every evening.      cholecalciferol, vitamin D3, 2,000 unit Cap Take 1 capsule by mouth every evening.       diclofenac sodium (VOLTAREN) 1 % Gel Apply 2 g topically once daily. 200 g 5    rosuvastatin (CRESTOR) 20 MG tablet TAKE 1 TABLET(20 MG) BY MOUTH EVERY EVENING 90 tablet 1    saw palmetto 160 MG capsule Take 160 mg by mouth every evening.        No facility-administered encounter medications on file as of 12/5/2023.     Review of Systems   ROS  Physical Exam     Vitals:    12/05/23 1352   BP: 115/70   Pulse: 70     Physical Exam  Constitutional:       General: He is not in acute distress.     Appearance: He is well-developed. He is not diaphoretic.   HENT:      Head: Normocephalic and atraumatic.      Right Ear: External ear normal.      Left Ear: External ear normal.      Nose: Nose normal.   Eyes:      Conjunctiva/sclera: Conjunctivae normal.      Pupils: Pupils are equal, round, and reactive to light.   Neck:      Thyroid: No thyromegaly.      Vascular: No JVD.      Trachea: No tracheal deviation.   Cardiovascular:      Rate and Rhythm: Normal rate and regular rhythm.      Heart sounds: Normal heart sounds. No murmur heard.     No friction rub. No gallop.   Pulmonary:      Effort: Pulmonary effort is normal. No respiratory distress.      Breath sounds: Normal breath sounds. No wheezing.   Chest:      Chest wall: No tenderness.   Abdominal:      General: Bowel sounds are normal. There is no distension.      Palpations: Abdomen is soft. There is no mass.      Tenderness: There is no abdominal tenderness. There is no guarding or rebound.   Genitourinary:     Penis: No tenderness.       Prostate: Normal.      Rectum: Normal.      Comments: Penis: redundant foreskin with phimosis  Musculoskeletal:         General: No tenderness or deformity.  Normal range of motion.      Cervical back: Normal range of motion and neck supple.   Lymphadenopathy:      Cervical: No cervical adenopathy.   Skin:     General: Skin is warm and dry.   Neurological:      Mental Status: He is alert and oriented to person, place, and time.   Psychiatric:         Behavior: Behavior normal.         Thought Content: Thought content normal.         LABS:  Lab Results   Component Value Date    PSA 2.0 05/19/2023    PSA 1.8 01/06/2022    PSA 0.65 09/14/2012    PSA 0.55 11/30/2010    PSA 0.56 01/04/2010    PSA 0.52 12/19/2008    PSA 0.55 11/21/2008    PSA 0.5 12/10/2007    PSA 0.4 08/11/2006    PSA 0.4 11/22/2005    PSADIAG 1.3 05/11/2021    PSADIAG 1.4 10/16/2019    PSADIAG 1.3 08/13/2018    PSADIAG 0.82 12/30/2015    PSADIAG 0.85 10/01/2013     Results for orders placed or performed in visit on 05/11/21   Prostate Specific Antigen, Diagnostic   Result Value Ref Range    PSA Diagnostic 1.3 0.00 - 4.00 ng/mL   Results for orders placed or performed in visit on 10/16/19   Prostate Specific Antigen, Diagnostic   Result Value Ref Range    PSA Diagnostic 1.4 0.00 - 4.00 ng/mL   Results for orders placed or performed in visit on 08/13/18   Prostate Specific Antigen, Diagnostic   Result Value Ref Range    PSA Diagnostic 1.3 0.00 - 4.00 ng/mL     Lab Results   Component Value Date    CREATININE 0.9 05/19/2023    CREATININE 1.0 02/09/2023    CREATININE 0.9 01/06/2022     No results found for this or any previous visit.  Urine Culture, Routine   Date Value Ref Range Status   02/09/2023 ESCHERICHIA COLI  >100,000 cfu/ml   (A)  Final     Hemoglobin A1C   Date Value Ref Range Status   05/19/2023 5.5 4.0 - 5.6 % Final     Comment:     ADA Screening Guidelines:  5.7-6.4%  Consistent with prediabetes  >or=6.5%  Consistent with diabetes    High levels of fetal hemoglobin interfere with the HbA1C  assay. Heterozygous hemoglobin variants (HbS, HgC, etc)do  not significantly interfere with this assay.    However, presence of multiple variants may affect accuracy.     01/06/2022 5.5 4.0 - 5.6 % Final     Comment:     ADA Screening Guidelines:  5.7-6.4%  Consistent with prediabetes  >or=6.5%  Consistent with diabetes    High levels of fetal hemoglobin interfere with the HbA1C  assay. Heterozygous hemoglobin variants (HbS, HgC, etc)do  not significantly interfere with this assay.   However, presence of multiple variants may affect accuracy.         Radiology:    Assessment and Plan:  Fadi was seen today for other.    Diagnoses and all orders for this visit:    Phimosis of penis  -     EKG 12-lead; Future    Pre-op evaluation  -     Comprehensive Metabolic Panel; Future  -     EKG 12-lead; Future  -     CBC Without Differential; Future    The patient will be scheduled for a circumcision.  The risks and benefits were explained to the patient in detail and consent was obtained.  The risks include but are not limited to bleeding, infection, pain,  fistula formation (hole in the urine tube), meatal stenosis (ulceration/scarring at the tip of the penis, skin bridge, removal of too much or too little skin, buried penis, penile swelling of discomfort, infection of incision or bleeding (usually mild) that may result in a hospital treatment.  The Patient was given the alternatives of observation and medical therapy as well. The patient understands these risks and has agreed to proceed with surgery.      - To OR for circumcision  - Consent obtained in preop with patient and wife. No further questions or concerns.    Yaya Austin MD  [facility] - PGY3

## 2024-01-11 NOTE — ANESTHESIA POSTPROCEDURE EVALUATION
Anesthesia Post Evaluation    Patient: Fadi Higgins Jr.    Procedure(s) Performed: Procedure(s) (LRB):  CIRCUMCISION (N/A)    Final Anesthesia Type: general      Patient location during evaluation: PACU  Patient participation: Yes- Able to Participate  Level of consciousness: awake and alert and oriented  Pain management: adequate  Airway patency: patent    PONV status at discharge: No PONV  Anesthetic complications: no      Cardiovascular status: blood pressure returned to baseline and hemodynamically stable  Respiratory status: unassisted  Hydration status: euvolemic  Follow-up not needed.            Vitals Value Taken Time   /73 01/10/24 1501   Temp 36.5 °C (97.7 °F) 01/10/24 1405   Pulse 73 01/10/24 1515   Resp 20 01/10/24 1515   SpO2 94 % 01/10/24 1515   Vitals shown include unvalidated device data.      No case tracking events are documented in the log.      Pain/Julianne Score: Julianne Score: 10 (1/10/2024  2:15 PM)

## 2024-02-01 ENCOUNTER — OFFICE VISIT (OUTPATIENT)
Dept: UROLOGY | Facility: CLINIC | Age: 84
End: 2024-02-01
Payer: MEDICARE

## 2024-02-01 VITALS
BODY MASS INDEX: 25.74 KG/M2 | WEIGHT: 164 LBS | DIASTOLIC BLOOD PRESSURE: 73 MMHG | SYSTOLIC BLOOD PRESSURE: 129 MMHG | HEART RATE: 77 BPM | HEIGHT: 67 IN

## 2024-02-01 DIAGNOSIS — Z98.890 H/O CIRCUMCISION: Primary | ICD-10-CM

## 2024-02-01 PROCEDURE — 99999 PR PBB SHADOW E&M-EST. PATIENT-LVL III: CPT | Mod: PBBFAC,,, | Performed by: UROLOGY

## 2024-02-01 PROCEDURE — 99499 UNLISTED E&M SERVICE: CPT | Mod: S$GLB,,, | Performed by: UROLOGY

## 2024-02-01 NOTE — PROGRESS NOTES
CC: post op visit following circumcision    Fadi Higgins Jr. is a 83 y.o. man who is here forpost op visit following circumcision  His PCP, Scott Joyner MD   C/o difficulty of retracting the foreskin and he is bothered by it.  He is interested in circumcision.    Date: 01/10/2024   Pre-Op Diagnosis: phimosis  Post-Op Diagnosis: same  Procedure(s) Performed:   1.  Circumcision   Findings:   Circumcision performed with dorsal slit, no complications       Past Medical History:   Diagnosis Date    BPH (benign prostatic hyperplasia)     Chronic constipation     Hyperlipidemia     Osteoarthritis     Primary osteoarthritis of left hip 2018    Vitiligo      Past Surgical History:   Procedure Laterality Date    CATARACT EXTRACTION W/  INTRAOCULAR LENS IMPLANT      OU w/ dr. bryant    CIRCUMCISION N/A 1/10/2024    Procedure: CIRCUMCISION;  Surgeon: Mickey Hendrix MD;  Location: [address];  Service: Urology;  Laterality: N/A;  1 hr    COLONOSCOPY N/A 2022    Procedure: COLONOSCOPY;  Surgeon: Lauren Rodriguez MD;  [facility]  Service: Endoscopy;  Laterality: N/A;  covid test 22 sarah, prep instr mailed -ml    COLONOSCOPY N/A 10/6/2022    Procedure: COLONOSCOPY;  Surgeon: Lauren Rodriguez MD;  Location: [facility] ([address] [place] [address]);  Service: Endoscopy;  Laterality: N/A;   fully vaccinated; instructions mailed-    EYE SURGERY      HIP ARTHROPLASTY Left 2019    Procedure: ARTHROPLASTY, HIP;  Surgeon: Miki Navarro MD;  Location: [facility];  Service: Orthopedics;  Laterality: Left;    JOINT REPLACEMENT Left 2019    LUIS     Social History     Tobacco Use    Smoking status: Former     Current packs/day: 0.00     Average packs/day: 1 pack/day for 5.0 years (5.0 ttl pk-yrs)     Types: Cigarettes     Start date: 1962     Quit date: 1967     Years since quittin.4    Smokeless tobacco: Never   Substance Use Topics    Alcohol use: No     Drug use: No     Family History   Problem Relation Age of Onset    Kidney disease Mother         diabetic    Diabetes Mother     Glaucoma Mother     Blindness Mother     Rheum arthritis Father     Macular degeneration Neg Hx     Retinal detachment Neg Hx      Allergy:  Review of patient's allergies indicates:  No Known Allergies  Outpatient Encounter Medications as of 2/1/2024   Medication Sig Dispense Refill    cetirizine (ZYRTEC) 10 MG tablet Take 10 mg by mouth every evening.      cholecalciferol, vitamin D3, 2,000 unit Cap Take 1 capsule by mouth every evening.       rosuvastatin (CRESTOR) 20 MG tablet TAKE 1 TABLET(20 MG) BY MOUTH EVERY EVENING 90 tablet 1    saw palmetto 160 MG capsule Take 160 mg by mouth every evening.       TURMERIC ORAL Take by mouth.      diclofenac sodium (VOLTAREN) 1 % Gel Apply 2 g topically once daily. (Patient not taking: Reported on 2/1/2024) 200 g 5    oxyCODONE (ROXICODONE) 5 MG immediate release tablet Take 1 tablet (5 mg total) by mouth every 6 (six) hours as needed for Pain. (Patient not taking: Reported on 2/1/2024) 5 tablet 0     No facility-administered encounter medications on file as of 2/1/2024.     Review of Systems   ROS  Physical Exam     Vitals:    02/01/24 1448   BP: 129/73   Pulse: 77       Physical Exam  Constitutional:       General: He is not in acute distress.     Appearance: He is well-developed. He is not diaphoretic.   HENT:      Head: Normocephalic and atraumatic.      Right Ear: External ear normal.      Left Ear: External ear normal.      Nose: Nose normal.   Eyes:      Conjunctiva/sclera: Conjunctivae normal.      Pupils: Pupils are equal, round, and reactive to light.   Neck:      Thyroid: No thyromegaly.      Vascular: No JVD.      Trachea: No tracheal deviation.   Cardiovascular:      Rate and Rhythm: Normal rate and regular rhythm.      Heart sounds: Normal heart sounds. No murmur heard.     No friction rub. No gallop.   Pulmonary:      Effort:  Pulmonary effort is normal. No respiratory distress.      Breath sounds: Normal breath sounds. No wheezing.   Chest:      Chest wall: No tenderness.   Abdominal:      General: Bowel sounds are normal. There is no distension.      Palpations: Abdomen is soft. There is no mass.      Tenderness: There is no abdominal tenderness. There is no guarding or rebound.   Genitourinary:     Penis: No tenderness.       Prostate: Normal.      Rectum: Normal.      Comments: Circumcision incision healing well. Some sutures remain.  Musculoskeletal:         General: No tenderness or deformity. Normal range of motion.      Cervical back: Normal range of motion and neck supple.   Lymphadenopathy:      Cervical: No cervical adenopathy.   Skin:     General: Skin is warm and dry.   Neurological:      Mental Status: He is alert and oriented to person, place, and time.   Psychiatric:         Behavior: Behavior normal.         Thought Content: Thought content normal.         LABS:  Lab Results   Component Value Date    PSA 2.0 05/19/2023    PSA 1.8 01/06/2022    PSA 0.65 09/14/2012    PSA 0.55 11/30/2010    PSA 0.56 01/04/2010    PSA 0.52 12/19/2008    PSA 0.55 11/21/2008    PSA 0.5 12/10/2007    PSA 0.4 08/11/2006    PSA 0.4 11/22/2005    PSADIAG 1.3 05/11/2021    PSADIAG 1.4 10/16/2019    PSADIAG 1.3 08/13/2018    PSADIAG 0.82 12/30/2015    PSADIAG 0.85 10/01/2013     Results for orders placed or performed in visit on 05/11/21   Prostate Specific Antigen, Diagnostic   Result Value Ref Range    PSA Diagnostic 1.3 0.00 - 4.00 ng/mL   Results for orders placed or performed in visit on 10/16/19   Prostate Specific Antigen, Diagnostic   Result Value Ref Range    PSA Diagnostic 1.4 0.00 - 4.00 ng/mL   Results for orders placed or performed in visit on 08/13/18   Prostate Specific Antigen, Diagnostic   Result Value Ref Range    PSA Diagnostic 1.3 0.00 - 4.00 ng/mL     Lab Results   Component Value Date    CREATININE 1.2 12/05/2023    CREATININE  0.9 05/19/2023    CREATININE 1.0 02/09/2023     No results found for this or any previous visit.  Urine Culture, Routine   Date Value Ref Range Status   02/09/2023 ESCHERICHIA COLI  >100,000 cfu/ml   (A)  Final     Hemoglobin A1C   Date Value Ref Range Status   05/19/2023 5.5 4.0 - 5.6 % Final     Comment:     ADA Screening Guidelines:  5.7-6.4%  Consistent with prediabetes  >or=6.5%  Consistent with diabetes    High levels of fetal hemoglobin interfere with the HbA1C  assay. Heterozygous hemoglobin variants (HbS, HgC, etc)do  not significantly interfere with this assay.   However, presence of multiple variants may affect accuracy.     01/06/2022 5.5 4.0 - 5.6 % Final     Comment:     ADA Screening Guidelines:  5.7-6.4%  Consistent with prediabetes  >or=6.5%  Consistent with diabetes    High levels of fetal hemoglobin interfere with the HbA1C  assay. Heterozygous hemoglobin variants (HbS, HgC, etc)do  not significantly interfere with this assay.   However, presence of multiple variants may affect accuracy.         Radiology:    Assessment and Plan:  Fadi was seen today for post-op evaluation.    Diagnoses and all orders for this visit:    H/O circumcision      Follow-up:  No follow-ups on file.

## 2024-04-29 DIAGNOSIS — E78.5 HYPERLIPIDEMIA, UNSPECIFIED HYPERLIPIDEMIA TYPE: ICD-10-CM

## 2024-04-29 NOTE — TELEPHONE ENCOUNTER
Care Due:                  Date            Visit Type   Department     Provider  --------------------------------------------------------------------------------                                EP -                              PRIMARY      [facility] INTERNAL  Last Visit: 05-      CARE ([facility])   KIRSTEN Joyner                              EP -                              PRIMARY      [facility] INTERNAL  Next Visit: 05-      CARE ([facility])   KIRSTEN Joyner                                                            Last  Test          Frequency    Reason                     Performed    Due Date  --------------------------------------------------------------------------------    Lipid Panel.  12 months..  rosuvastatin.............  05- 05-    [facility] Embedded Care Due Messages. Reference number: [id number].   4/29/2024 9:48:16 AM CDT

## 2024-04-30 RX ORDER — ROSUVASTATIN CALCIUM 20 MG/1
TABLET, COATED ORAL
Qty: 90 TABLET | Refills: 0 | Status: SHIPPED | OUTPATIENT
Start: 2024-04-30 | End: 2024-05-20 | Stop reason: SDUPTHER

## 2024-04-30 NOTE — TELEPHONE ENCOUNTER
Provider Staff:  Action required for this patient    Requires labs      Please see care gap opportunities below in Care Due Message.    Thanks!  [facility]     Appointments      Date Provider   Last Visit   5/19/2023 Scott Joyner MD   Next Visit   5/20/2024 Scott Joyner MD     Refill Decision Note   Fadi Higgins  is requesting a refill authorization.    Brief Assessment and Rationale for Refill:  Approve       Medication Therapy Plan:  Reviewed acute care/admission visit notes; No follow up with PCP recommended by acute care provider; Approved per protocol      Extended chart review required: Yes   Comments:     Note composed:11:30 AM 04/30/2024

## 2024-05-20 ENCOUNTER — TELEPHONE (OUTPATIENT)
Dept: INTERNAL MEDICINE | Facility: CLINIC | Age: 84
End: 2024-05-20

## 2024-05-20 ENCOUNTER — LAB VISIT (OUTPATIENT)
Dept: LAB | Facility: HOSPITAL | Age: 84
End: 2024-05-20
Attending: INTERNAL MEDICINE
Payer: MEDICARE

## 2024-05-20 ENCOUNTER — OFFICE VISIT (OUTPATIENT)
Dept: INTERNAL MEDICINE | Facility: CLINIC | Age: 84
End: 2024-05-20
Payer: MEDICARE

## 2024-05-20 VITALS
HEART RATE: 69 BPM | SYSTOLIC BLOOD PRESSURE: 119 MMHG | OXYGEN SATURATION: 97 % | DIASTOLIC BLOOD PRESSURE: 77 MMHG | BODY MASS INDEX: 25.74 KG/M2 | HEIGHT: 67 IN | WEIGHT: 164 LBS

## 2024-05-20 DIAGNOSIS — H91.93 BILATERAL HEARING LOSS, UNSPECIFIED HEARING LOSS TYPE: ICD-10-CM

## 2024-05-20 DIAGNOSIS — M62.830 SPASM OF MUSCLE OF LOWER BACK: ICD-10-CM

## 2024-05-20 DIAGNOSIS — H40.013 OAG (OPEN ANGLE GLAUCOMA) SUSPECT, LOW RISK, BILATERAL: ICD-10-CM

## 2024-05-20 DIAGNOSIS — N40.1 BPH WITH URINARY OBSTRUCTION: ICD-10-CM

## 2024-05-20 DIAGNOSIS — Z23 NEED FOR SHINGLES VACCINE: ICD-10-CM

## 2024-05-20 DIAGNOSIS — N32.81 OAB (OVERACTIVE BLADDER): ICD-10-CM

## 2024-05-20 DIAGNOSIS — N13.8 BPH WITH URINARY OBSTRUCTION: ICD-10-CM

## 2024-05-20 DIAGNOSIS — R73.09 BLOOD GLUCOSE ABNORMAL: ICD-10-CM

## 2024-05-20 DIAGNOSIS — H53.2 DIPLOPIA: ICD-10-CM

## 2024-05-20 DIAGNOSIS — Z00.00 ENCOUNTER FOR ANNUAL PHYSICAL EXAM: Primary | ICD-10-CM

## 2024-05-20 DIAGNOSIS — E78.5 HYPERLIPIDEMIA, UNSPECIFIED HYPERLIPIDEMIA TYPE: ICD-10-CM

## 2024-05-20 DIAGNOSIS — E55.9 VITAMIN D INSUFFICIENCY: ICD-10-CM

## 2024-05-20 DIAGNOSIS — E78.2 MIXED HYPERLIPIDEMIA: ICD-10-CM

## 2024-05-20 DIAGNOSIS — H91.93 BILATERAL HEARING LOSS, UNSPECIFIED HEARING LOSS TYPE: Primary | ICD-10-CM

## 2024-05-20 DIAGNOSIS — L80 VITILIGO: ICD-10-CM

## 2024-05-20 DIAGNOSIS — I70.0 ABDOMINAL AORTIC ATHEROSCLEROSIS: ICD-10-CM

## 2024-05-20 DIAGNOSIS — M16.12 PRIMARY OSTEOARTHRITIS OF LEFT HIP: ICD-10-CM

## 2024-05-20 DIAGNOSIS — Z23 NEED FOR TDAP VACCINATION: ICD-10-CM

## 2024-05-20 LAB
CHOLEST SERPL-MCNC: 196 MG/DL (ref 120–199)
CHOLEST/HDLC SERPL: 4.1 {RATIO} (ref 2–5)
ESTIMATED AVG GLUCOSE: 114 MG/DL (ref 68–131)
HBA1C MFR BLD: 5.6 % (ref 4–5.6)
HDLC SERPL-MCNC: 48 MG/DL (ref 40–75)
HDLC SERPL: 24.5 % (ref 20–50)
LDLC SERPL CALC-MCNC: 133.6 MG/DL (ref 63–159)
NONHDLC SERPL-MCNC: 148 MG/DL
TRIGL SERPL-MCNC: 72 MG/DL (ref 30–150)

## 2024-05-20 PROCEDURE — 83036 HEMOGLOBIN GLYCOSYLATED A1C: CPT | Performed by: INTERNAL MEDICINE

## 2024-05-20 PROCEDURE — 3078F DIAST BP <80 MM HG: CPT | Mod: CPTII,S$GLB,, | Performed by: INTERNAL MEDICINE

## 2024-05-20 PROCEDURE — 1160F RVW MEDS BY RX/DR IN RCRD: CPT | Mod: CPTII,S$GLB,, | Performed by: INTERNAL MEDICINE

## 2024-05-20 PROCEDURE — 1125F AMNT PAIN NOTED PAIN PRSNT: CPT | Mod: CPTII,S$GLB,, | Performed by: INTERNAL MEDICINE

## 2024-05-20 PROCEDURE — 1101F PT FALLS ASSESS-DOCD LE1/YR: CPT | Mod: CPTII,S$GLB,, | Performed by: INTERNAL MEDICINE

## 2024-05-20 PROCEDURE — 3288F FALL RISK ASSESSMENT DOCD: CPT | Mod: CPTII,S$GLB,, | Performed by: INTERNAL MEDICINE

## 2024-05-20 PROCEDURE — 3074F SYST BP LT 130 MM HG: CPT | Mod: CPTII,S$GLB,, | Performed by: INTERNAL MEDICINE

## 2024-05-20 PROCEDURE — 80061 LIPID PANEL: CPT | Performed by: INTERNAL MEDICINE

## 2024-05-20 PROCEDURE — 1159F MED LIST DOCD IN RCRD: CPT | Mod: CPTII,S$GLB,, | Performed by: INTERNAL MEDICINE

## 2024-05-20 PROCEDURE — 99397 PER PM REEVAL EST PAT 65+ YR: CPT | Mod: S$GLB,,, | Performed by: INTERNAL MEDICINE

## 2024-05-20 PROCEDURE — 36415 COLL VENOUS BLD VENIPUNCTURE: CPT | Performed by: INTERNAL MEDICINE

## 2024-05-20 PROCEDURE — 99999 PR PBB SHADOW E&M-EST. PATIENT-LVL V: CPT | Mod: PBBFAC,,, | Performed by: INTERNAL MEDICINE

## 2024-05-20 RX ORDER — ROSUVASTATIN CALCIUM 20 MG/1
20 TABLET, COATED ORAL NIGHTLY
Qty: 90 TABLET | Refills: 3 | Status: SHIPPED | OUTPATIENT
Start: 2024-05-20

## 2024-05-20 RX ORDER — ZOSTER VACCINE RECOMBINANT, ADJUVANTED 50 MCG/0.5
0.5 KIT INTRAMUSCULAR ONCE
Qty: 1 EACH | Refills: 0 | Status: SHIPPED | OUTPATIENT
Start: 2024-05-20 | End: 2024-05-20

## 2024-05-20 NOTE — PROGRESS NOTES
Subjective:       Patient ID: Fadi Higgins Jr. is a 83 y.o. male.    Chief Complaint: Annual Exam      HPI  Fadi Higgins Jr. is a 83 y.o. year old male with HLD, OA, BPH, vitiligo, history of primary osteoarthritis of L hip s/p LUIS, presents for annual exam.    Review of Systems   Constitutional:  Negative for fatigue, fever and unexpected weight change.   HENT:  Negative for congestion, hearing loss, sinus pressure, sore throat and trouble swallowing.    Eyes:  Negative for redness, itching and visual disturbance.   Respiratory:  Negative for cough, chest tightness, shortness of breath and wheezing.    Cardiovascular:  Negative for chest pain, palpitations and leg swelling.   Gastrointestinal:  Negative for abdominal distention, abdominal pain, blood in stool, constipation, diarrhea, nausea and vomiting.   Genitourinary:  Negative for difficulty urinating, dysuria and hematuria.   Musculoskeletal:  Positive for back pain. Negative for arthralgias, gait problem and myalgias.   Skin:  Negative for rash.   Neurological:  Negative for dizziness, syncope, weakness, numbness and headaches.   Hematological:  Does not bruise/bleed easily.   Psychiatric/Behavioral:  Negative for sleep disturbance. The patient is not nervous/anxious.          Past Medical History:   Diagnosis Date    BPH (benign prostatic hyperplasia)     Chronic constipation     Hyperlipidemia     Osteoarthritis     Primary osteoarthritis of left hip 7/16/2018    Vitiligo         Prior to Admission medications    Medication Sig Start Date End Date Taking? Authorizing Provider   cetirizine (ZYRTEC) 10 MG tablet Take 10 mg by mouth every evening. 11/15/23  Yes Provider, Historical   cholecalciferol, vitamin D3, 2,000 unit Cap Take 1 capsule by mouth every evening.    Yes Provider, Historical   rosuvastatin (CRESTOR) 20 MG tablet TAKE 1 TABLET(20 MG) BY MOUTH EVERY EVENING 4/30/24  Yes Scott Joyner MD   saw palmetto 160 MG capsule Take 160 mg by mouth  "every evening.    Yes Provider, Historical   TURMERIC ORAL Take by mouth.   Yes Provider, Historical   diclofenac sodium (VOLTAREN) 1 % Gel Apply 2 g topically once daily.  Patient not taking: Reported on 2/1/2024 5/19/23   Scott Joyner MD   oxyCODONE (ROXICODONE) 5 MG immediate release tablet Take 1 tablet (5 mg total) by mouth every 6 (six) hours as needed for Pain.  Patient not taking: Reported on 2/1/2024 1/10/24   Yaya Austin MD        Past medical history, surgical history, and family medical history reviewed and updated as appropriate.    Medications and allergies reviewed.     Objective:          Vitals:    05/20/24 0944   BP: 119/77   BP Location: Right arm   Patient Position: Sitting   BP Method: Medium (Manual)   Pulse: 69   SpO2: 97%   Weight: 74.4 kg (164 lb 0.4 oz)   Height: 5' 7" (1.702 m)     Body mass index is 25.69 kg/m².  Physical Exam  Constitutional:       General: He is not in acute distress.     Appearance: He is well-developed.   HENT:      Head: Normocephalic and atraumatic.      Nose: Nose normal.   Eyes:      General: No scleral icterus.     Extraocular Movements: Extraocular movements intact.   Neck:      Thyroid: No thyromegaly.      Vascular: No JVD.      Trachea: No tracheal deviation.   Cardiovascular:      Rate and Rhythm: Normal rate and regular rhythm.      Heart sounds: Normal heart sounds. No murmur heard.     No friction rub. No gallop.   Pulmonary:      Effort: Pulmonary effort is normal. No respiratory distress.      Breath sounds: Normal breath sounds. No wheezing or rales.   Abdominal:      General: Bowel sounds are normal. There is no distension.      Palpations: Abdomen is soft. There is no mass.      Tenderness: There is no abdominal tenderness.   Musculoskeletal:         General: No tenderness. Normal range of motion.      Cervical back: Normal range of motion and neck supple.   Lymphadenopathy:      Cervical: No cervical adenopathy.   Skin:     General: Skin is " warm and dry.      Findings: No rash.   Neurological:      Mental Status: He is alert and oriented to person, place, and time.      Cranial Nerves: No cranial nerve deficit.      Deep Tendon Reflexes: Reflexes normal.   Psychiatric:         Behavior: Behavior normal.         Lab Results   Component Value Date    WBC 5.14 12/05/2023    HGB 12.9 (L) 12/05/2023    HCT 39.1 (L) 12/05/2023     12/05/2023    CHOL 208 (H) 05/19/2023    TRIG 63 05/19/2023    HDL 46 05/19/2023    ALT 22 12/05/2023    AST 22 12/05/2023     12/05/2023    K 4.2 12/05/2023     12/05/2023    CREATININE 1.2 12/05/2023    BUN 14 12/05/2023    CO2 28 12/05/2023    TSH 1.204 05/19/2023    PSA 2.0 05/19/2023    INR 1.1 05/09/2019    HGBA1C 5.5 05/19/2023       Assessment:       1. Encounter for annual physical exam    2. Mixed hyperlipidemia    3. OAG (open angle glaucoma) suspect, low risk, bilateral    4. Abdominal aortic atherosclerosis    5. BPH with urinary obstruction    6. OAB (overactive bladder)    7. Vitamin D insufficiency    8. Primary osteoarthritis of left hip    9. Vitiligo    10. Blood glucose abnormal    11. Need for Tdap vaccination    12. Need for shingles vaccine    13. Diplopia    14. Bilateral hearing loss, unspecified hearing loss type    15. Spasm of muscle of lower back    16. Hyperlipidemia, unspecified hyperlipidemia type          Plan:     Fadi was seen today for annual exam.    Diagnoses and all orders for this visit:    Encounter for annual physical exam    Mixed hyperlipidemia  Comments:  controlled on crestor 20, refill placed, recheck lipid panel  Orders:  -     CBC Auto Differential; Future  -     Comprehensive Metabolic Panel; Future  -     TSH; Future  -     Lipid Panel; Future    OAG (open angle glaucoma) suspect, low risk, bilateral  Comments:  pt to f/u with ophthalmology    Abdominal aortic atherosclerosis  -     CBC Auto Differential; Future    BPH with urinary obstruction    OAB  (overactive bladder)    Vitamin D insufficiency    Primary osteoarthritis of left hip  Comments:  s/p LUIS 2018; doing well, no complaints    Vitiligo    Blood glucose abnormal  -     HEMOGLOBIN A1C; Future    Need for Tdap vaccination  -     DIPH,PERTUSS,ACEL,,TET VAC,PF, ADULT (ADACEL) 2 Lf-(2.5-5-3-5 mcg)-5Lf/0.5 mL Syrg; Inject 0.5 mLs into the muscle once. for 1 dose    Need for shingles vaccine  -     varicella-zoster gE-AS01B, PF, (SHINGRIX, PF,) 50 mcg/0.5 mL injection; Inject 0.5 mLs into the muscle once. for 1 dose    Diplopia  Comments:  on going issue was years, had seen Dr. Galvez, did not follow up d/t covid-19. pt to f/u with ophthalmology  Orders:  -     Ambulatory referral/consult to Ophthalmology; Future    Bilateral hearing loss, unspecified hearing loss type  -     Ambulatory referral/consult to Audiology; Future    Spasm of muscle of lower back  Comments:  after putting tito / working on the deck. no neurological symptoms. no radiculopathy. muscle spasms noted. pt to take tylenol.  Orders:  -     Ambulatory referral/consult to Physical/Occupational Therapy; Future    Hyperlipidemia, unspecified hyperlipidemia type  -     rosuvastatin (CRESTOR) 20 MG tablet; Take 1 tablet (20 mg total) by mouth every evening.    Benign physical examination, no issues identified. Will obtain routine labwork and age appropriate health screenings.     Health maintenance reviewed with patient.     Follow up in about 1 year (around 5/20/2025).    Scott Joyner MD  Internal Medicine / Primary Care  [facility]  5/20/2024

## 2024-05-20 NOTE — TELEPHONE ENCOUNTER
----- Message from Remy Richardson MA sent at 5/20/2024 11:08 AM CDT -----  Regarding: FW: Audiology Referral  Audiology requesting referral be placed for ENT  ----- Message -----  From: Jamilah Su  Sent: 5/20/2024  10:34 AM CDT  To: Scott Joyner MD; Ar Chavez Staff  Subject: Audiology Referral                               Good morning Dr. Joyner,       Audiology received a referral for patient to have a hearing test.  It is department policy that patient needs to see ENT to have his ears checked and cleaned before testing.  Can you please place a referral to ENT.  The ENT  will reach out to the patient to schedule both appointments.    Thanks  Jamilah  Audiology Clinic Coordinator

## 2024-05-20 NOTE — PATIENT INSTRUCTIONS
Fasting labwork today.   Schedule ophthalmology appointment. (Dr. Bowen)  Schedule audiology appointment.    Tetanus shot and shingles shot at pharmacy today.     Shingles vaccination - two shot series, 2-6 months apart.     Referral placed to physical therapy for your lower back, they will be contacting you to schedule.     Return to clinic in 1 year or sooner if needed.

## 2024-05-21 ENCOUNTER — TELEPHONE (OUTPATIENT)
Dept: AUDIOLOGY | Facility: CLINIC | Age: 84
End: 2024-05-21
Payer: MEDICARE

## 2024-05-28 ENCOUNTER — CLINICAL SUPPORT (OUTPATIENT)
Dept: AUDIOLOGY | Facility: CLINIC | Age: 84
End: 2024-05-28
Payer: MEDICARE

## 2024-05-28 DIAGNOSIS — H90.3 SENSORINEURAL HEARING LOSS (SNHL) OF BOTH EARS: ICD-10-CM

## 2024-05-28 PROCEDURE — 92567 TYMPANOMETRY: CPT | Mod: S$GLB,,, | Performed by: AUDIOLOGIST

## 2024-05-28 PROCEDURE — 99999 PR PBB SHADOW E&M-EST. PATIENT-LVL II: CPT | Mod: PBBFAC,,, | Performed by: AUDIOLOGIST

## 2024-05-28 PROCEDURE — 92557 COMPREHENSIVE HEARING TEST: CPT | Mod: S$GLB,,, | Performed by: AUDIOLOGIST

## 2024-05-28 NOTE — PROGRESS NOTES
Fadi Higgins Jr. was seen 05/28/2024 for an audiological evaluation. Patient complains of bilateral gradual hearing loss. He is having difficulty understanding conversational speech. He denies any tinnitus or dizziness. He has a history of noise exposure from working at [employer] for 35 years.    Results reveal a mild-to-moderately severe sensorineural hearing loss 750-8000 Hz for the right ear, and  mild-to-severe sensorineural hearing loss 500-8000 Hz for the left ear.   Speech Reception Thresholds were  30 dBHL for the right ear and 40 dBHL for the left ear.   Word recognition scores were good for the right ear and excellent for the left ear.  Tympanograms were Type A, normal for the right ear and Type Ad, hypermobile for the left ear.    Patient was counseled on the above findings.    Recommendations:  Follow-up with ENT, as needed.   Repeat audiological evaluation in 1-2 years to monitor hearing, or sooner if needed.  Consider a hearing aid consultation. Patient provided with clinic hearing aid information packet and a copy of audiogram.   Wear hearing protection devices when around loud noise.

## 2024-06-10 ENCOUNTER — PATIENT MESSAGE (OUTPATIENT)
Dept: INTERNAL MEDICINE | Facility: CLINIC | Age: 84
End: 2024-06-10
Payer: MEDICARE

## 2024-07-08 ENCOUNTER — OFFICE VISIT (OUTPATIENT)
Dept: OPHTHALMOLOGY | Facility: CLINIC | Age: 84
End: 2024-07-08
Payer: MEDICARE

## 2024-07-08 DIAGNOSIS — H53.2 DIPLOPIA: ICD-10-CM

## 2024-07-08 DIAGNOSIS — H50.21 HYPERTROPIA OF RIGHT EYE: ICD-10-CM

## 2024-07-08 DIAGNOSIS — H49.11 FOURTH NERVE PALSY OF RIGHT EYE: Primary | ICD-10-CM

## 2024-07-08 PROBLEM — H53.10 SUBJECTIVE VISUAL DISTURBANCE OF BOTH EYES: Status: RESOLVED | Noted: 2021-10-07 | Resolved: 2024-07-08

## 2024-07-08 PROCEDURE — 92060 SENSORIMOTOR EXAMINATION: CPT | Mod: S$GLB,,, | Performed by: STUDENT IN AN ORGANIZED HEALTH CARE EDUCATION/TRAINING PROGRAM

## 2024-07-08 PROCEDURE — 1101F PT FALLS ASSESS-DOCD LE1/YR: CPT | Mod: CPTII,S$GLB,, | Performed by: STUDENT IN AN ORGANIZED HEALTH CARE EDUCATION/TRAINING PROGRAM

## 2024-07-08 PROCEDURE — 3288F FALL RISK ASSESSMENT DOCD: CPT | Mod: CPTII,S$GLB,, | Performed by: STUDENT IN AN ORGANIZED HEALTH CARE EDUCATION/TRAINING PROGRAM

## 2024-07-08 PROCEDURE — 99213 OFFICE O/P EST LOW 20 MIN: CPT | Mod: S$GLB,,, | Performed by: STUDENT IN AN ORGANIZED HEALTH CARE EDUCATION/TRAINING PROGRAM

## 2024-07-08 PROCEDURE — 1159F MED LIST DOCD IN RCRD: CPT | Mod: CPTII,S$GLB,, | Performed by: STUDENT IN AN ORGANIZED HEALTH CARE EDUCATION/TRAINING PROGRAM

## 2024-07-08 PROCEDURE — 1160F RVW MEDS BY RX/DR IN RCRD: CPT | Mod: CPTII,S$GLB,, | Performed by: STUDENT IN AN ORGANIZED HEALTH CARE EDUCATION/TRAINING PROGRAM

## 2024-07-08 PROCEDURE — 1126F AMNT PAIN NOTED NONE PRSNT: CPT | Mod: CPTII,S$GLB,, | Performed by: STUDENT IN AN ORGANIZED HEALTH CARE EDUCATION/TRAINING PROGRAM

## 2024-07-08 PROCEDURE — 99999 PR PBB SHADOW E&M-EST. PATIENT-LVL III: CPT | Mod: PBBFAC,,, | Performed by: STUDENT IN AN ORGANIZED HEALTH CARE EDUCATION/TRAINING PROGRAM

## 2024-07-08 NOTE — ASSESSMENT & PLAN NOTE
Patient with intermittent diplopia for a few. Mainly seen when driving and fatigued    7/8/24:   Has mild left head tilt  Buy single zimmerman jacquelin, right hypertropia with +3 step test    Plan:  Chronic head tilt and motility today consistent with long standing, decompensated fourth nerve palsy  Given small angle - treatment would be prisms (offered trial of 1 base down Fresnel over right eye)  Given symptoms are so infrequent and not bothersome, patient elects to observe at this time    Advised to follow up strabismus PRN if diplopia worsens  Patient declined dilation today - F/u with general ophthalmology in 6 months

## 2024-07-08 NOTE — PROGRESS NOTES
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 Pt report intermittent horizontal double vision, initially   noticed over 5 years ago. The double vision is intermittent. Mainly   happens when tired or hen driving. He will sometimes need to close one   eye, or blink a few times to make it go away. He is currently wearing   glasses full time. Pt denies blurry vision, eye pain and eye misalignment.     Eye meds: none     POHx.:   1. OAG (open angle glaucoma) suspect, low risk, bilateral  2. S/p cataract extraction and insertion of intraocular lens, unspecified   laterality  Last edited by Keith Teague MD on 7/8/2024 10:03 AM.        ROS    Negative for: Constitutional, Gastrointestinal, Neurological, Skin,   Genitourinary, Musculoskeletal, HENT, Endocrine, Cardiovascular, Eyes,   Respiratory, Psychiatric, Allergic/Imm, Heme/Lymph  Last edited by Keith Teague MD on 7/8/2024 10:03 AM.        Assessment /Plan     For exam results, see Encounter Report.    Fourth nerve palsy of right eye    Hypertropia of right eye    Diplopia  -     Ambulatory referral/consult to Ophthalmology        Problem List Items Addressed This Visit          Ophtho    Fourth nerve palsy of right eye - Primary    Current Assessment & Plan     Patient with intermittent diplopia for a few. Mainly seen when driving and fatigued    7/8/24:   Has mild left head tilt  Buy single zimmerman jacquelin, right hypertropia with +3 step test    Plan:  Chronic head tilt and motility today consistent with long standing, decompensated fourth nerve palsy  Given small angle - treatment would be prisms (offered trial of 1 base down Fresnel over right eye)  Given symptoms are so infrequent and not bothersome, patient elects to observe at this time    Advised to follow up strabismus PRN if diplopia worsens  Patient declined dilation today - F/u with general ophthalmology in 6 months          Hypertropia of right eye     Other Visit Diagnoses       Diplopia                 Keith Teague MD  Pediatric Ophthalmology and Adult Strabismus  [facility]

## 2024-07-30 DIAGNOSIS — E78.5 HYPERLIPIDEMIA, UNSPECIFIED HYPERLIPIDEMIA TYPE: ICD-10-CM

## 2024-07-30 RX ORDER — ROSUVASTATIN CALCIUM 20 MG/1
20 TABLET, COATED ORAL NIGHTLY
Qty: 90 TABLET | Refills: 3 | Status: SHIPPED | OUTPATIENT
Start: 2024-07-30

## 2024-07-30 NOTE — TELEPHONE ENCOUNTER
No care due was identified.  [facility] Embedded Care Due Messages. Reference number: [id number].   7/30/2024 1:03:17 PM CDT

## 2024-07-31 NOTE — TELEPHONE ENCOUNTER
Refill Decision Note   Fadi Higgins  is requesting a refill authorization.  Brief Assessment and Rationale for Refill:  Approve     Medication Therapy Plan:       Medication Reconciliation Completed: No   Comments:     No Care Gaps recommended.     Note composed:7:17 PM 07/30/2024

## 2025-02-24 ENCOUNTER — OFFICE VISIT (OUTPATIENT)
Dept: INTERNAL MEDICINE | Facility: CLINIC | Age: 85
End: 2025-02-24
Payer: MEDICARE

## 2025-02-24 ENCOUNTER — IMMUNIZATION (OUTPATIENT)
Dept: INTERNAL MEDICINE | Facility: CLINIC | Age: 85
End: 2025-02-24
Payer: MEDICARE

## 2025-02-24 ENCOUNTER — TELEPHONE (OUTPATIENT)
Dept: ORTHOPEDICS | Facility: CLINIC | Age: 85
End: 2025-02-24
Payer: MEDICARE

## 2025-02-24 ENCOUNTER — OFFICE VISIT (OUTPATIENT)
Dept: PODIATRY | Facility: CLINIC | Age: 85
End: 2025-02-24
Payer: MEDICARE

## 2025-02-24 ENCOUNTER — HOSPITAL ENCOUNTER (OUTPATIENT)
Dept: RADIOLOGY | Facility: HOSPITAL | Age: 85
Discharge: HOME | End: 2025-02-24
Attending: PHYSICIAN ASSISTANT
Payer: MEDICARE

## 2025-02-24 VITALS
SYSTOLIC BLOOD PRESSURE: 122 MMHG | HEIGHT: 67 IN | OXYGEN SATURATION: 98 % | HEART RATE: 60 BPM | DIASTOLIC BLOOD PRESSURE: 72 MMHG | WEIGHT: 160.25 LBS | BODY MASS INDEX: 25.15 KG/M2

## 2025-02-24 VITALS — BODY MASS INDEX: 25.15 KG/M2 | HEIGHT: 67 IN | WEIGHT: 160.25 LBS

## 2025-02-24 DIAGNOSIS — S99.101A CLOSED PHYSEAL FRACTURE OF FIFTH METATARSAL BONE OF RIGHT FOOT, UNSPECIFIED PHYSEAL FRACTURE CONFIGURATION, INITIAL ENCOUNTER: Primary | ICD-10-CM

## 2025-02-24 DIAGNOSIS — N40.1 BPH WITH URINARY OBSTRUCTION: ICD-10-CM

## 2025-02-24 DIAGNOSIS — R73.9 ELEVATED RANDOM BLOOD GLUCOSE LEVEL: ICD-10-CM

## 2025-02-24 DIAGNOSIS — M79.671 RIGHT FOOT PAIN: ICD-10-CM

## 2025-02-24 DIAGNOSIS — Z12.5 PROSTATE CANCER SCREENING: ICD-10-CM

## 2025-02-24 DIAGNOSIS — N13.8 BPH WITH URINARY OBSTRUCTION: ICD-10-CM

## 2025-02-24 DIAGNOSIS — S99.101A CLOSED PHYSEAL FRACTURE OF FIFTH METATARSAL BONE OF RIGHT FOOT, UNSPECIFIED PHYSEAL FRACTURE CONFIGURATION, INITIAL ENCOUNTER: ICD-10-CM

## 2025-02-24 DIAGNOSIS — E78.2 MIXED HYPERLIPIDEMIA: ICD-10-CM

## 2025-02-24 DIAGNOSIS — Z00.00 ROUTINE PHYSICAL EXAMINATION: Primary | ICD-10-CM

## 2025-02-24 DIAGNOSIS — Z23 NEED FOR VACCINATION: Primary | ICD-10-CM

## 2025-02-24 PROCEDURE — 99999 PR PBB SHADOW E&M-EST. PATIENT-LVL III: CPT | Mod: PBBFAC,,, | Performed by: PODIATRIST

## 2025-02-24 PROCEDURE — G0008 ADMIN INFLUENZA VIRUS VAC: HCPCS | Mod: S$GLB,,, | Performed by: INTERNAL MEDICINE

## 2025-02-24 PROCEDURE — 1125F AMNT PAIN NOTED PAIN PRSNT: CPT | Mod: CPTII,S$GLB,, | Performed by: PHYSICIAN ASSISTANT

## 2025-02-24 PROCEDURE — 1101F PT FALLS ASSESS-DOCD LE1/YR: CPT | Mod: CPTII,S$GLB,, | Performed by: PHYSICIAN ASSISTANT

## 2025-02-24 PROCEDURE — 3288F FALL RISK ASSESSMENT DOCD: CPT | Mod: CPTII,S$GLB,, | Performed by: PHYSICIAN ASSISTANT

## 2025-02-24 PROCEDURE — 1101F PT FALLS ASSESS-DOCD LE1/YR: CPT | Mod: CPTII,S$GLB,, | Performed by: PODIATRIST

## 2025-02-24 PROCEDURE — 3078F DIAST BP <80 MM HG: CPT | Mod: CPTII,S$GLB,, | Performed by: PHYSICIAN ASSISTANT

## 2025-02-24 PROCEDURE — 1159F MED LIST DOCD IN RCRD: CPT | Mod: CPTII,S$GLB,, | Performed by: PODIATRIST

## 2025-02-24 PROCEDURE — 73630 X-RAY EXAM OF FOOT: CPT | Mod: TC,RT

## 2025-02-24 PROCEDURE — 73630 X-RAY EXAM OF FOOT: CPT | Mod: 26,RT,, | Performed by: RADIOLOGY

## 2025-02-24 PROCEDURE — 1160F RVW MEDS BY RX/DR IN RCRD: CPT | Mod: CPTII,S$GLB,, | Performed by: PHYSICIAN ASSISTANT

## 2025-02-24 PROCEDURE — 3288F FALL RISK ASSESSMENT DOCD: CPT | Mod: CPTII,S$GLB,, | Performed by: PODIATRIST

## 2025-02-24 PROCEDURE — 99999 PR PBB SHADOW E&M-EST. PATIENT-LVL IV: CPT | Mod: PBBFAC,,, | Performed by: PHYSICIAN ASSISTANT

## 2025-02-24 PROCEDURE — 3074F SYST BP LT 130 MM HG: CPT | Mod: CPTII,S$GLB,, | Performed by: PHYSICIAN ASSISTANT

## 2025-02-24 PROCEDURE — 99214 OFFICE O/P EST MOD 30 MIN: CPT | Mod: S$GLB,,, | Performed by: PHYSICIAN ASSISTANT

## 2025-02-24 PROCEDURE — 1125F AMNT PAIN NOTED PAIN PRSNT: CPT | Mod: CPTII,S$GLB,, | Performed by: PODIATRIST

## 2025-02-24 PROCEDURE — 1159F MED LIST DOCD IN RCRD: CPT | Mod: CPTII,S$GLB,, | Performed by: PHYSICIAN ASSISTANT

## 2025-02-24 PROCEDURE — 99203 OFFICE O/P NEW LOW 30 MIN: CPT | Mod: S$GLB,,, | Performed by: PODIATRIST

## 2025-02-24 PROCEDURE — 90653 IIV ADJUVANT VACCINE IM: CPT | Mod: S$GLB,,, | Performed by: INTERNAL MEDICINE

## 2025-02-24 NOTE — TELEPHONE ENCOUNTER
----- Message from Ana sent at 2/24/2025  7:49 AM CST -----  Name of Caller: Abril from the Primary care dept, Nature of Call: requesting a same day appt Best Call Back Number: call Abril at ext [phone] to inform  Additional Information:Abril from the Primary care dept, calling to see about getting PT Fadi Higgins seen today for having a  Closed physeal fracture of fifth metatarsal bone of right foot, unspecified physeal fracture configuration, initial encounter. PT is here from [place] and is needing today due to the long travel as well as the fractured foot. Please call back to assist as soon as possible I reached out to the chat and did not get a reply so I am sending a message for a reply

## 2025-02-24 NOTE — PROGRESS NOTES
Per provider request- reached out to Ortho staff to ask for an appt today.  - awaiting a response for today.

## 2025-02-24 NOTE — PATIENT INSTRUCTIONS
Continue to ice and use Aspercreme or Voltaren for your foot    We will call about your xray and lab results.

## 2025-02-24 NOTE — PROGRESS NOTES
"Subjective     Patient ID: Fadi Higgins Jr. is a 84 y.o. male with PMH of BPH, HLD, OA and vitiligo.     Chief Complaint: Foot Injury (Right foot ) and Follow-up    HPI    Established pt of Scott Joyner MD (new to me)      C/o right foot pain, onset about 2 weeks ago. A board/bed rail fell from a shelf and hit the side of his foot. Its still sore with mild swelling. He has tried ice and aspercreme which help. He is able to bear weight.     He also has a hand written note from his wife (former nurse) requested blood work, including a PSA.     He otherwise feels well.       Past Medical History:   Diagnosis Date    BPH (benign prostatic hyperplasia)     Chronic constipation     Hyperlipidemia     Osteoarthritis     Primary osteoarthritis of left hip 7/16/2018    Vitiligo      Social History[1]    Review of patient's allergies indicates:  No Known Allergies      Review of Systems   Constitutional:  Negative for chills, fever and unexpected weight change.   Respiratory:  Negative for cough and shortness of breath.    Cardiovascular:  Negative for chest pain and leg swelling.   Gastrointestinal:  Negative for abdominal pain, nausea and vomiting.   Musculoskeletal:  Positive for arthralgias.   Integumentary:  Negative for rash.   Neurological:  Negative for weakness and headaches.          Objective  /72 (BP Location: Left arm, Patient Position: Sitting)   Pulse 60   Ht 5' 7" (1.702 m)   Wt 72.7 kg (160 lb 4.4 oz)   SpO2 98%   BMI 25.10 kg/m²       Physical Exam  Vitals reviewed.   Constitutional:       General: He is not in acute distress.     Appearance: He is well-developed. He is not ill-appearing.   HENT:      Head: Normocephalic and atraumatic.   Cardiovascular:      Rate and Rhythm: Normal rate and regular rhythm.      Pulses:           Dorsalis pedis pulses are 2+ on the right side and 2+ on the left side.        Posterior tibial pulses are 2+ on the right side and 2+ on the left side.      Heart " sounds: No murmur heard.  Pulmonary:      Effort: Pulmonary effort is normal.      Breath sounds: Normal breath sounds. No wheezing or rales.   Abdominal:      General: Bowel sounds are normal.      Palpations: Abdomen is soft.      Tenderness: There is no abdominal tenderness.   Musculoskeletal:      Right lower leg: No edema.      Left lower leg: No edema.        Feet:    Feet:      Right foot:      Skin integrity: No ulcer, blister or skin breakdown.      Left foot:      Skin integrity: No ulcer, blister or skin breakdown.   Skin:     General: Skin is warm and dry.      Findings: No rash.   Neurological:      Mental Status: He is alert.   Psychiatric:         Mood and Affect: Mood normal.            Assessment and Plan     1. Routine physical examination  HM reviewed and updated  Flu shot today  -     CBC Auto Differential; Future; Expected date: 02/24/2025  -     Comprehensive Metabolic Panel; Future; Expected date: 02/24/2025  -     TSH; Future; Expected date: 02/24/2025  -     Lipid Panel; Future; Expected date: 02/24/2025  -     Hemoglobin A1C; Future; Expected date: 02/24/2025    2. Right foot pain  Continue ice and topical analgesics  -     X-Ray Foot Complete Right; Future; Expected date: 02/24/2025    3. BPH with urinary obstruction  -     PSA, Screening; Future; Expected date: 02/24/2025    4. Mixed hyperlipidemia  Continue statin therapy  -     CBC Auto Differential; Future; Expected date: 02/24/2025  -     Comprehensive Metabolic Panel; Future; Expected date: 02/24/2025  -     TSH; Future; Expected date: 02/24/2025  -     Lipid Panel; Future; Expected date: 02/24/2025    5. Elevated random blood glucose level  -     CBC Auto Differential; Future; Expected date: 02/24/2025  -     Hemoglobin A1C; Future; Expected date: 02/24/2025    6. Prostate cancer screening (we discussed his age, further psa not recommended, pt/wife desires)  -     PSA, Screening; Future; Expected date: 02/24/2025      RTC in 1 yr  for next annual with PCP or sooner if needed.       Sheela Haines PA-C    Addendum:    X-Ray Foot Complete Right  Narrative: EXAMINATION:  XR FOOT COMPLETE 3 VIEW RIGHT    CLINICAL HISTORY:  board fell and hit right lateral foot 2 weeks ago;. Pain in right foot    TECHNIQUE:  AP, lateral, and oblique views of the right foot were performed.    COMPARISON:  None    FINDINGS:  There is a nondisplaced fracture identified involving the distal aspect the 5th metatarsal bone.  Suggestion of the small periosteal formation suggesting a healing fracture.  Clinical correlation is necessary.  No other fractures or dislocation identified.  Mild DJD.  Impression: Fracture of the 5th metatarsal    Electronically signed by: Leodan Agarwal MD  Date:    2025  Time:    10:18        Arranged appt today with [facility] (closer to pt home)  Reviewed plan of care with pt who voiced understanding.     Sheela Haines PA-C    Future Appointments   Date Time Provider Department Center   2025  2:00 PM Vahid Farris DPM [facility]              [1]   Social History  Tobacco Use    Smoking status: Former     Current packs/day: 0.00     Average packs/day: 1 pack/day for 5.0 years (5.0 ttl pk-yrs)     Types: Cigarettes     Start date: 1962     Quit date: 1967     Years since quittin.4    Smokeless tobacco: Never   Substance Use Topics    Alcohol use: No    Drug use: No

## 2025-02-24 NOTE — PROGRESS NOTES
Subjective:      Patient ID: Fadi Higgins Jr. is a 84 y.o. male.    Chief Complaint: No chief complaint on file.    Fadi is a 84 y.o. male who presents to the podiatry clinic  with complaint of  right foot pain started 2 weeks ago when a large board in the garage fell on his foot, the pain persisted and he had x-rays today that showed a fracture of the fifth metatarsal and was referred to me. Has been walking in normal shoes    Review of Systems   Constitutional: Negative for chills and fever.   Cardiovascular:  Negative for claudication and leg swelling.   Respiratory:  Negative for shortness of breath.    Skin:  Negative for itching, nail changes and rash.   Musculoskeletal:  Negative for muscle cramps, muscle weakness and myalgias.        Right foot pain   Gastrointestinal:  Negative for nausea and vomiting.   Neurological:  Negative for focal weakness, loss of balance, numbness and paresthesias.           Objective:      Physical Exam  Constitutional:       General: He is not in acute distress.     Appearance: He is well-developed. He is not diaphoretic.   Cardiovascular:      Pulses:           Dorsalis pedis pulses are 2+ on the right side and 2+ on the left side.        Posterior tibial pulses are 2+ on the right side and 2+ on the left side.      Comments: < 3 sec capillary refill time to toes 1-5 bilateral. Toes and feet are warm to touch proximally with normal distal cooling b/l. There is some hair growth on the feet and toes b/l. There is no edema b/l. No spider veins or varicosities present b/l.     Musculoskeletal:      Comments: Equinus noted b/l ankles with < 10 deg DF noted. MMT 5/5 in DF/PF/Inv/Ev resistance with no reproduction of pain in any direction. Passive range of motion of ankle and pedal joints is painless b/l.    Right foot pain with palpation around the fifth metatarsal head   Skin:     General: Skin is warm and dry.      Coloration: Skin is not pale.      Findings: No abrasion,  bruising, burn, ecchymosis, erythema, laceration, lesion, petechiae or rash.      Nails: There is no clubbing.      Comments: Skin temperature, texture and turgor within normal limits.   Neurological:      Mental Status: He is alert and oriented to person, place, and time.      Sensory: No sensory deficit.      Motor: No tremor, atrophy or abnormal muscle tone.      Comments: Negative tinel sign bilateral.   Psychiatric:         Behavior: Behavior normal.               Assessment:       Encounter Diagnoses   Name Primary?    Right foot pain     Closed physeal fracture of fifth metatarsal bone of right foot, unspecified physeal fracture configuration, initial encounter Yes         Plan:       Diagnoses and all orders for this visit:    Closed physeal fracture of fifth metatarsal bone of right foot, unspecified physeal fracture configuration, initial encounter  -     Ambulatory referral/consult to Podiatry  -     X-Ray Foot Complete Right; Future    Right foot pain  -     Ambulatory referral/consult to Podiatry  -     X-Ray Foot Complete Right; Future      I counseled the patient on his conditions, their implications and medical management.    X-ray reviewed with patient   noting the fracture mildly displaced at the fifth metatarsal neck with medial angulation slightly    Recommend offloading in a darco post op shoe for 4-6 weeks    Return in 4 weeks with x-rays    Vahid Farris DPM

## 2025-02-25 ENCOUNTER — RESULTS FOLLOW-UP (OUTPATIENT)
Dept: INTERNAL MEDICINE | Facility: CLINIC | Age: 85
End: 2025-02-25

## 2025-03-24 ENCOUNTER — HOSPITAL ENCOUNTER (OUTPATIENT)
Dept: RADIOLOGY | Facility: HOSPITAL | Age: 85
Discharge: HOME | End: 2025-03-24
Attending: PODIATRIST
Payer: MEDICARE

## 2025-03-24 ENCOUNTER — OFFICE VISIT (OUTPATIENT)
Dept: PODIATRY | Facility: CLINIC | Age: 85
End: 2025-03-24
Payer: MEDICARE

## 2025-03-24 VITALS — WEIGHT: 160.25 LBS | HEIGHT: 67 IN | BODY MASS INDEX: 25.15 KG/M2

## 2025-03-24 DIAGNOSIS — S92.352D CLOSED DISPLACED FRACTURE OF FIFTH METATARSAL BONE OF LEFT FOOT WITH ROUTINE HEALING, SUBSEQUENT ENCOUNTER: Primary | ICD-10-CM

## 2025-03-24 DIAGNOSIS — M79.671 RIGHT FOOT PAIN: ICD-10-CM

## 2025-03-24 DIAGNOSIS — S99.101A CLOSED PHYSEAL FRACTURE OF FIFTH METATARSAL BONE OF RIGHT FOOT, UNSPECIFIED PHYSEAL FRACTURE CONFIGURATION, INITIAL ENCOUNTER: ICD-10-CM

## 2025-03-24 PROCEDURE — 1160F RVW MEDS BY RX/DR IN RCRD: CPT | Mod: CPTII,S$GLB,, | Performed by: PODIATRIST

## 2025-03-24 PROCEDURE — 1126F AMNT PAIN NOTED NONE PRSNT: CPT | Mod: CPTII,S$GLB,, | Performed by: PODIATRIST

## 2025-03-24 PROCEDURE — 3288F FALL RISK ASSESSMENT DOCD: CPT | Mod: CPTII,S$GLB,, | Performed by: PODIATRIST

## 2025-03-24 PROCEDURE — 73630 X-RAY EXAM OF FOOT: CPT | Mod: 26,RT,, | Performed by: RADIOLOGY

## 2025-03-24 PROCEDURE — 99213 OFFICE O/P EST LOW 20 MIN: CPT | Mod: S$GLB,,, | Performed by: PODIATRIST

## 2025-03-24 PROCEDURE — 99999 PR PBB SHADOW E&M-EST. PATIENT-LVL III: CPT | Mod: PBBFAC,,, | Performed by: PODIATRIST

## 2025-03-24 PROCEDURE — 1101F PT FALLS ASSESS-DOCD LE1/YR: CPT | Mod: CPTII,S$GLB,, | Performed by: PODIATRIST

## 2025-03-24 PROCEDURE — 1159F MED LIST DOCD IN RCRD: CPT | Mod: CPTII,S$GLB,, | Performed by: PODIATRIST

## 2025-03-24 PROCEDURE — 73630 X-RAY EXAM OF FOOT: CPT | Mod: TC,PO,RT

## 2025-03-24 NOTE — PROGRESS NOTES
Subjective:      Patient ID: Fadi Higgins Jr. is a 84 y.o. male.    Chief Complaint: Follow-up (Right foot injury )    Fadi is a 84 y.o. male who presents to the podiatry clinic  with complaint of  right foot pain started 2 weeks ago when a large board in the garage fell on his foot, the pain persisted and he had x-rays today that showed a fracture of the fifth metatarsal and was referred to me. Has been walking in normal shoes    3/24/25: Patient returns for follow up right foot fifth metatarsal fracture, he only wore the darco shoe for about a week then it was hurting his back being off with the other shoes and so he went back to normal shoes on his own. Relates no pain to the area except occasional shooting at night that comes and goes quickly, walking in his shoe pain free    Review of Systems   Constitutional: Negative for chills and fever.   Cardiovascular:  Negative for claudication and leg swelling.   Respiratory:  Negative for shortness of breath.    Skin:  Negative for itching, nail changes and rash.   Musculoskeletal:  Negative for muscle cramps, muscle weakness and myalgias.        Right foot pain   Gastrointestinal:  Negative for nausea and vomiting.   Neurological:  Negative for focal weakness, loss of balance, numbness and paresthesias.           Objective:      Physical Exam  Constitutional:       General: He is not in acute distress.     Appearance: He is well-developed. He is not diaphoretic.   Cardiovascular:      Pulses:           Dorsalis pedis pulses are 2+ on the right side and 2+ on the left side.        Posterior tibial pulses are 2+ on the right side and 2+ on the left side.      Comments: < 3 sec capillary refill time to toes 1-5 bilateral. Toes and feet are warm to touch proximally with normal distal cooling b/l. There is some hair growth on the feet and toes b/l. There is no edema b/l. No spider veins or varicosities present b/l.     Musculoskeletal:      Comments: Equinus noted b/l  ankles with < 10 deg DF noted. MMT 5/5 in DF/PF/Inv/Ev resistance with no reproduction of pain in any direction. Passive range of motion of ankle and pedal joints is painless b/l.    Right foot no pain with palpation around the fifth metatarsal head   Skin:     General: Skin is warm and dry.      Coloration: Skin is not pale.      Findings: No abrasion, bruising, burn, ecchymosis, erythema, laceration, lesion, petechiae or rash.      Nails: There is no clubbing.      Comments: Skin temperature, texture and turgor within normal limits.   Neurological:      Mental Status: He is alert and oriented to person, place, and time.      Sensory: No sensory deficit.      Motor: No tremor, atrophy or abnormal muscle tone.      Comments: Negative tinel sign bilateral.   Psychiatric:         Behavior: Behavior normal.               Assessment:       Encounter Diagnosis   Name Primary?    Closed displaced fracture of fifth metatarsal bone of left foot with routine healing, subsequent encounter Yes           Plan:       Fadi was seen today for follow-up.    Diagnoses and all orders for this visit:    Closed displaced fracture of fifth metatarsal bone of left foot with routine healing, subsequent encounter        I counseled the patient on his conditions, their implications and medical management.    X-ray reviewed with patient   noting a lot of callus formation at the fracture site consistent with his being in a shoe too early and excessive motion at the fracture site    As he has no pain and the callus formation seems to be healing the area he can remain in shoes and let me know if he has problems with it moving forward    Return ALPHONSE Farris, BRENT

## 2025-05-08 ENCOUNTER — OFFICE VISIT (OUTPATIENT)
Dept: INTERNAL MEDICINE | Facility: CLINIC | Age: 85
End: 2025-05-08
Payer: MEDICARE

## 2025-05-08 VITALS
OXYGEN SATURATION: 100 % | WEIGHT: 161.81 LBS | HEIGHT: 67 IN | DIASTOLIC BLOOD PRESSURE: 66 MMHG | HEART RATE: 59 BPM | SYSTOLIC BLOOD PRESSURE: 130 MMHG | BODY MASS INDEX: 25.4 KG/M2

## 2025-05-08 DIAGNOSIS — I70.0 ABDOMINAL AORTIC ATHEROSCLEROSIS: ICD-10-CM

## 2025-05-08 DIAGNOSIS — N40.1 BPH WITH URINARY OBSTRUCTION: ICD-10-CM

## 2025-05-08 DIAGNOSIS — H50.21 HYPERTROPIA OF RIGHT EYE: ICD-10-CM

## 2025-05-08 DIAGNOSIS — Z00.00 ENCOUNTER FOR ANNUAL PHYSICAL EXAM: Primary | ICD-10-CM

## 2025-05-08 DIAGNOSIS — E78.2 MIXED HYPERLIPIDEMIA: ICD-10-CM

## 2025-05-08 DIAGNOSIS — R35.1 NOCTURIA: ICD-10-CM

## 2025-05-08 DIAGNOSIS — N13.8 BPH WITH URINARY OBSTRUCTION: ICD-10-CM

## 2025-05-08 DIAGNOSIS — H49.11 FOURTH NERVE PALSY OF RIGHT EYE: ICD-10-CM

## 2025-05-08 PROCEDURE — 3075F SYST BP GE 130 - 139MM HG: CPT | Mod: CPTII,S$GLB,, | Performed by: INTERNAL MEDICINE

## 2025-05-08 PROCEDURE — 1126F AMNT PAIN NOTED NONE PRSNT: CPT | Mod: CPTII,S$GLB,, | Performed by: INTERNAL MEDICINE

## 2025-05-08 PROCEDURE — 1159F MED LIST DOCD IN RCRD: CPT | Mod: CPTII,S$GLB,, | Performed by: INTERNAL MEDICINE

## 2025-05-08 PROCEDURE — 1101F PT FALLS ASSESS-DOCD LE1/YR: CPT | Mod: CPTII,S$GLB,, | Performed by: INTERNAL MEDICINE

## 2025-05-08 PROCEDURE — 3078F DIAST BP <80 MM HG: CPT | Mod: CPTII,S$GLB,, | Performed by: INTERNAL MEDICINE

## 2025-05-08 PROCEDURE — 99397 PER PM REEVAL EST PAT 65+ YR: CPT | Mod: S$GLB,,, | Performed by: INTERNAL MEDICINE

## 2025-05-08 PROCEDURE — 3288F FALL RISK ASSESSMENT DOCD: CPT | Mod: CPTII,S$GLB,, | Performed by: INTERNAL MEDICINE

## 2025-05-08 PROCEDURE — 99999 PR PBB SHADOW E&M-EST. PATIENT-LVL III: CPT | Mod: PBBFAC,,, | Performed by: INTERNAL MEDICINE

## 2025-05-08 RX ORDER — TAMSULOSIN HYDROCHLORIDE 0.4 MG/1
0.4 CAPSULE ORAL DAILY
Qty: 90 CAPSULE | Refills: 3 | Status: SHIPPED | OUTPATIENT
Start: 2025-05-08 | End: 2026-05-08

## 2025-05-08 RX ORDER — CETIRIZINE HYDROCHLORIDE 10 MG/1
10 TABLET ORAL NIGHTLY
Status: CANCELLED | OUTPATIENT
Start: 2025-05-08

## 2025-05-08 NOTE — PATIENT INSTRUCTIONS
Schedule ophthalmology appointment.   Shingles #2 at pharmacy today.     Start tamsulosin (Flomax) 0.4 mg daily for enlarged prostate.     Return to clinic in 1 year or sooner if needed.

## 2025-05-08 NOTE — PROGRESS NOTES
Subjective:       Patient ID: Fadi Higgins Jr. is a 84 y.o. male.    Chief Complaint: Annual Exam      HPI  Fadi Higgins Jr. is a 84 y.o. year old male with primary osteoarthritis of L hip s/p LUIS, HLD, OA, BPH, vitiligo presents for annual exam. Doing well with no new complaints. Still suffering with nocturia.     Review of Systems   Constitutional:  Negative for activity change, appetite change, chills, fatigue, fever and unexpected weight change.   HENT:  Negative for congestion, rhinorrhea and sore throat.    Eyes:  Negative for visual disturbance.   Respiratory:  Negative for shortness of breath.    Cardiovascular:  Negative for chest pain.   Gastrointestinal:  Negative for abdominal pain, diarrhea, nausea and vomiting.   Genitourinary:  Negative for difficulty urinating and dysuria.   Musculoskeletal:  Negative for arthralgias, back pain and myalgias.   Skin:  Negative for color change and rash.   Neurological:  Negative for dizziness, weakness and headaches.         Past Medical History:   Diagnosis Date    BPH (benign prostatic hyperplasia)     Chronic constipation     Hyperlipidemia     Osteoarthritis     Primary osteoarthritis of left hip 7/16/2018    Vitiligo         Prior to Admission medications    Medication Sig Start Date End Date Taking? Authorizing Provider   cetirizine (ZYRTEC) 10 MG tablet Take 10 mg by mouth every evening. 11/15/23  Yes Provider, Historical   cholecalciferol, vitamin D3, 2,000 unit Cap Take 1 capsule by mouth every evening.    Yes Provider, Historical   rosuvastatin (CRESTOR) 20 MG tablet TAKE 1 TABLET(20 MG) BY MOUTH EVERY EVENING 7/30/24  Yes Scott Joyner MD   saw palmetto 160 MG capsule Take 160 mg by mouth every evening.    Yes Provider, Historical   TURMERIC ORAL Take by mouth.   Yes Provider, Historical        Past medical history, surgical history, and family medical history reviewed and updated as appropriate.    Medications and allergies reviewed.     Objective:     "      Vitals:    05/08/25 1429   BP: 130/66   BP Location: Right arm   Patient Position: Sitting   Pulse: (!) 59   SpO2: 100%   Weight: 73.4 kg (161 lb 13.1 oz)   Height: 5' 7" (1.702 m)     Body mass index is 25.34 kg/m².  Physical Exam  Constitutional:       General: He is not in acute distress.     Appearance: He is well-developed.   HENT:      Head: Normocephalic and atraumatic.      Nose: Nose normal.   Eyes:      General: No scleral icterus.     Extraocular Movements: Extraocular movements intact.   Neck:      Thyroid: No thyromegaly.      Vascular: No JVD.      Trachea: No tracheal deviation.   Cardiovascular:      Rate and Rhythm: Normal rate and regular rhythm.      Heart sounds: Normal heart sounds. No murmur heard.     No friction rub. No gallop.   Pulmonary:      Effort: Pulmonary effort is normal. No respiratory distress.      Breath sounds: Normal breath sounds. No wheezing or rales.   Abdominal:      General: Bowel sounds are normal. There is no distension.      Palpations: Abdomen is soft. There is no mass.      Tenderness: There is no abdominal tenderness.   Musculoskeletal:         General: No tenderness. Normal range of motion.      Cervical back: Normal range of motion and neck supple.   Lymphadenopathy:      Cervical: No cervical adenopathy.   Skin:     General: Skin is warm and dry.      Findings: No rash.   Neurological:      Mental Status: He is alert and oriented to person, place, and time.      Cranial Nerves: No cranial nerve deficit.      Deep Tendon Reflexes: Reflexes normal.   Psychiatric:         Behavior: Behavior normal.         Lab Results   Component Value Date    WBC 4.06 02/24/2025    HGB 12.5 (L) 02/24/2025    HCT 39.2 (L) 02/24/2025     02/24/2025    CHOL 167 02/24/2025    TRIG 60 02/24/2025    HDL 48 02/24/2025    ALT 18 02/24/2025    AST 33 02/24/2025     02/24/2025    K 4.4 02/24/2025     02/24/2025    CREATININE 0.9 02/24/2025    BUN 11 02/24/2025    CO2 " 24 02/24/2025    TSH 1.277 02/24/2025    PSA 2.3 02/24/2025    INR 1.1 05/09/2019    HGBA1C 5.6 02/24/2025       Assessment:       1. Encounter for annual physical exam    2. Mixed hyperlipidemia    3. Abdominal aortic atherosclerosis    4. BPH with urinary obstruction    5. 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 Hypertropia of right eye          Plan:     Fadi was seen today for annual exam.    Diagnoses and all orders for this visit:    Encounter for annual physical exam    Mixed hyperlipidemia  Comments:  on rosuvastatin 20, LDL goal <100, LDL last checked 107 near goal. Will continue current management    Abdominal aortic atherosclerosis  Comments:  on statin    BPH with urinary obstruction  Comments:  will try tamsulosin and see if this gives patient relief of nocturia and frequent urinations.  Orders:  -     tamsulosin (FLOMAX) 0.4 mg Cap; Take 1 capsule (0.4 mg total) by mouth once daily.    Nocturia  -     tamsulosin (FLOMAX) 0.4 mg Cap; Take 1 capsule (0.4 mg total) by mouth once daily.    Fourth nerve palsy of right eye  -     Ambulatory referral/consult to Ophthalmology; Future    Hypertropia of right eye  -     Ambulatory referral/consult to Ophthalmology; Future    Other orders  The following orders have not been finalized:  -     Cancel: cetirizine (ZYRTEC) 10 MG tablet        Health maintenance reviewed with patient.     Follow up in about 1 year (around 5/8/2026).    Scott Joyner MD  Internal Medicine / Primary Care  [facility]  5/8/2025

## 2025-08-27 ENCOUNTER — TELEPHONE (OUTPATIENT)
Dept: INTERNAL MEDICINE | Facility: CLINIC | Age: 85
End: 2025-08-27
Payer: MEDICARE

## (undated) DEVICE — SUT 4-0 VICRYL / SH

## (undated) DEVICE — SPONGE COTTON TRAY 4X4IN

## (undated) DEVICE — SPONGE GAUZE 16PLY 4X4

## (undated) DEVICE — DRAPE LAP T SHT W/ INSTR PAD

## (undated) DEVICE — TRAY MINOR GEN SURG OMC

## (undated) DEVICE — BNDG COFLEX FOAM LF2 ST 4X5YD

## (undated) DEVICE — DRESSING XEROFORM 1X8IN

## (undated) DEVICE — SUT CHROMIC 3-0 SH 27IN GUT

## (undated) DEVICE — DRESSING XEROFORM NONADH 1X8IN

## (undated) DEVICE — SUT MONOCRYL 4-0 PS-2

## (undated) DEVICE — ELECTRODE REM PLYHSV RETURN 9

## (undated) DEVICE — NDL HYPO REG 25G X 1 1/2

## (undated) DEVICE — TRAY SKIN SCRUB WET PREMIUM